# Patient Record
Sex: FEMALE | Race: WHITE | Employment: UNEMPLOYED | URBAN - METROPOLITAN AREA
[De-identification: names, ages, dates, MRNs, and addresses within clinical notes are randomized per-mention and may not be internally consistent; named-entity substitution may affect disease eponyms.]

---

## 2018-10-04 ENCOUNTER — OFFICE VISIT (OUTPATIENT)
Dept: NEUROLOGY | Facility: CLINIC | Age: 63
End: 2018-10-04
Payer: MEDICAID

## 2018-10-04 VITALS
RESPIRATION RATE: 16 BRPM | BODY MASS INDEX: 32 KG/M2 | WEIGHT: 183 LBS | HEART RATE: 70 BPM | SYSTOLIC BLOOD PRESSURE: 130 MMHG | DIASTOLIC BLOOD PRESSURE: 80 MMHG

## 2018-10-04 DIAGNOSIS — R42 VERTIGO: Primary | ICD-10-CM

## 2018-10-04 DIAGNOSIS — R55 NEAR SYNCOPE: ICD-10-CM

## 2018-10-04 DIAGNOSIS — E55.9 VITAMIN D DEFICIENCY: ICD-10-CM

## 2018-10-04 PROCEDURE — 99204 OFFICE O/P NEW MOD 45 MIN: CPT | Performed by: OTHER

## 2018-10-04 RX ORDER — CHOLECALCIFEROL (VITAMIN D3) 1250 MCG
CAPSULE ORAL WEEKLY
Refills: 0 | COMMUNITY
Start: 2018-09-17 | End: 2019-02-04

## 2018-10-04 RX ORDER — MECLIZINE HYDROCHLORIDE 25 MG/1
TABLET ORAL AS NEEDED
Refills: 0 | COMMUNITY
Start: 2018-08-30 | End: 2019-02-04

## 2018-10-04 NOTE — PATIENT INSTRUCTIONS
Refill policies:    • Allow 2-3 business days for refills; controlled substances may take longer.   • Contact your pharmacy at least 5 days prior to running out of medication and have them send an electronic request or submit request through the “request re entire amount billed. Precertification and Prior Authorizations: If your physician has recommended that you have a procedure or additional testing performed.   [facility]) will contact your insurance carrier to obtain pre-certi

## 2018-10-04 NOTE — PROGRESS NOTES
[facility]    Date of consult: 10/4/2018    CC: vertigo     HPI: Reta Erazo is a 66year old female with past medical history as listed below presents here for initial evaluation of one episode of vertigo 2 months ago, pt repor soft, non tender, no masses  Extremities: no edema  Carotid bruits: no  Neurological Examination:  Mental status: A & O X 3  Language: Fluency with normal naming and repetition, comprehension normal  Speech: no dysarthria  CN: II-XII    pupils 2-3 mm equal

## 2018-10-31 ENCOUNTER — TELEPHONE (OUTPATIENT)
Dept: NEUROLOGY | Facility: CLINIC | Age: 63
End: 2018-10-31

## 2018-10-31 NOTE — TELEPHONE ENCOUNTER
Approval #[id number] at [facility] from 10/31/18-12/15/18 for CPT code 99791 2D Doppler     Called patient gave her above information she was thankful for my call.

## 2018-11-12 ENCOUNTER — HOSPITAL ENCOUNTER (OUTPATIENT)
Dept: ULTRASOUND IMAGING | Age: 63
Discharge: HOME | End: 2018-11-12
Attending: Other
Payer: MEDICAID

## 2018-11-12 DIAGNOSIS — R42 VERTIGO: ICD-10-CM

## 2018-11-12 DIAGNOSIS — E55.9 VITAMIN D DEFICIENCY: ICD-10-CM

## 2018-11-12 DIAGNOSIS — R55 NEAR SYNCOPE: ICD-10-CM

## 2018-11-12 PROCEDURE — 93880 EXTRACRANIAL BILAT STUDY: CPT | Performed by: OTHER

## 2018-11-15 ENCOUNTER — TELEPHONE (OUTPATIENT)
Dept: NEUROLOGY | Facility: CLINIC | Age: 63
End: 2018-11-15

## 2019-02-04 ENCOUNTER — TELEPHONE (OUTPATIENT)
Dept: INTERNAL MEDICINE CLINIC | Facility: CLINIC | Age: 64
End: 2019-02-04

## 2019-02-04 ENCOUNTER — OFFICE VISIT (OUTPATIENT)
Dept: INTERNAL MEDICINE CLINIC | Facility: CLINIC | Age: 64
End: 2019-02-04
Payer: MEDICAID

## 2019-02-04 VITALS
TEMPERATURE: 98 F | SYSTOLIC BLOOD PRESSURE: 110 MMHG | WEIGHT: 188.5 LBS | OXYGEN SATURATION: 96 % | HEART RATE: 92 BPM | DIASTOLIC BLOOD PRESSURE: 85 MMHG | RESPIRATION RATE: 12 BRPM | BODY MASS INDEX: 33.4 KG/M2 | HEIGHT: 63 IN

## 2019-02-04 DIAGNOSIS — J34.2 DEVIATED SEPTUM: Primary | ICD-10-CM

## 2019-02-04 DIAGNOSIS — L50.2 URTICARIA DUE TO COLD: ICD-10-CM

## 2019-02-04 DIAGNOSIS — E55.9 VITAMIN D DEFICIENCY: ICD-10-CM

## 2019-02-04 DIAGNOSIS — I10 ESSENTIAL HYPERTENSION: ICD-10-CM

## 2019-02-04 DIAGNOSIS — H90.5 SENSORINEURAL HEARING LOSS (SNHL) OF LEFT EAR, UNSPECIFIED HEARING STATUS ON CONTRALATERAL SIDE: ICD-10-CM

## 2019-02-04 PROCEDURE — 99204 OFFICE O/P NEW MOD 45 MIN: CPT | Performed by: INTERNAL MEDICINE

## 2019-02-04 RX ORDER — LISINOPRIL 5 MG/1
5 TABLET ORAL DAILY
Refills: 0 | COMMUNITY
Start: 2019-01-10 | End: 2019-02-12

## 2019-02-04 RX ORDER — CHOLECALCIFEROL (VITAMIN D3) 50 MCG
4000 TABLET ORAL DAILY
COMMUNITY

## 2019-02-04 RX ORDER — TETANUS TOXOID, REDUCED DIPHTHERIA TOXOID AND ACELLULAR PERTUSSIS VACCINE, ADSORBED 5; 2.5; 8; 8; 2.5 [IU]/.5ML; [IU]/.5ML; UG/.5ML; UG/.5ML; UG/.5ML
SUSPENSION INTRAMUSCULAR
Refills: 0 | COMMUNITY
Start: 2018-12-01 | End: 2019-02-04

## 2019-02-04 RX ORDER — AMOXICILLIN AND CLAVULANATE POTASSIUM 875; 125 MG/1; MG/1
1 TABLET, FILM COATED ORAL 2 TIMES DAILY
Qty: 14 TABLET | Refills: 0 | Status: SHIPPED | OUTPATIENT
Start: 2019-02-04 | End: 2019-02-11

## 2019-02-04 NOTE — PROGRESS NOTES
Tommie Conley is a 66year old female. HPI:   Patient presents for the following issues. 1. Started to feel sick 2 weeks ago: states she has a deviated septum so she often gets URIs. She has sinus congestion. rhinorrea and sneezing in the morning.  Has Social History    Tobacco Use      Smoking status: Former Smoker        Packs/day: 0.00        Years: 15.00        Pack years: 0        Types: Cigarettes        Quit date:         Years since quittin.0      Smokeless tobacco: Never Used    Alcoh malignancy. Chlamydiaand gonorrhea were not detected  Lab    The patient indicates understanding of these issues and agrees to the plan. The patient is asked to return in 6 weeks for CPX.   Prince Davis MD

## 2019-02-04 NOTE — TELEPHONE ENCOUNTER
Records requested from;   [facility] fax# [phone]  Requesting: labs, mammogram. Colonoscopy, and pap.    Fax sent successfully to fax #[phone]

## 2019-02-04 NOTE — PATIENT INSTRUCTIONS
Please bring your machine into your next office visit to ensure it is accurate. I like the [employer] brand. Please keep the receipt. Please measure your blood pressure and pulse daily and record these values.  Please measure your blood pressure in the afterno

## 2019-02-12 RX ORDER — LISINOPRIL 5 MG/1
5 TABLET ORAL DAILY
Qty: 90 TABLET | Refills: 0 | Status: SHIPPED | OUTPATIENT
Start: 2019-02-12 | End: 2019-02-14

## 2019-02-12 NOTE — TELEPHONE ENCOUNTER
Refill requested:   Requested Prescriptions     Pending Prescriptions Disp Refills   • lisinopril 5 MG Oral Tab 90 tablet 0     Sig: Take 1 tablet (5 mg total) by mouth daily.        Failed protocol    Hypertension Medications Protocol Failed2/12 10:09 AM

## 2019-02-12 NOTE — TELEPHONE ENCOUNTER
lisinopril 5 MG   [id number] - [address] AT [facility] OF [address] [place] [address], [phone], [phone]

## 2019-02-15 RX ORDER — LISINOPRIL 5 MG/1
TABLET ORAL
Qty: 90 TABLET | Refills: 0 | Status: SHIPPED | OUTPATIENT
Start: 2019-02-15 | End: 2019-05-17

## 2019-02-15 NOTE — TELEPHONE ENCOUNTER
Refill requested:   Requested Prescriptions     Pending Prescriptions Disp Refills   • LISINOPRIL 5 MG Oral Tab [Pharmacy Med Name: LISINOPRIL 5MG TABLETS] 90 tablet 0     Sig: TAKE 1 TABLET BY MOUTH DAILY       Failed protocol  Hypertension Medications Pr

## 2019-04-29 ENCOUNTER — OFFICE VISIT (OUTPATIENT)
Dept: INTERNAL MEDICINE CLINIC | Facility: CLINIC | Age: 64
End: 2019-04-29
Payer: MEDICAID

## 2019-04-29 ENCOUNTER — APPOINTMENT (OUTPATIENT)
Dept: LAB | Age: 64
End: 2019-04-29
Attending: INTERNAL MEDICINE
Payer: MEDICAID

## 2019-04-29 ENCOUNTER — TELEPHONE (OUTPATIENT)
Dept: INTERNAL MEDICINE CLINIC | Facility: CLINIC | Age: 64
End: 2019-04-29

## 2019-04-29 VITALS
WEIGHT: 193.25 LBS | SYSTOLIC BLOOD PRESSURE: 126 MMHG | HEART RATE: 77 BPM | TEMPERATURE: 99 F | DIASTOLIC BLOOD PRESSURE: 76 MMHG | HEIGHT: 63 IN | RESPIRATION RATE: 16 BRPM | OXYGEN SATURATION: 99 % | BODY MASS INDEX: 34.24 KG/M2

## 2019-04-29 DIAGNOSIS — I10 ESSENTIAL HYPERTENSION: ICD-10-CM

## 2019-04-29 DIAGNOSIS — E55.9 VITAMIN D DEFICIENCY: ICD-10-CM

## 2019-04-29 DIAGNOSIS — Z12.4 SCREENING FOR CERVICAL CANCER: ICD-10-CM

## 2019-04-29 DIAGNOSIS — Z12.11 SCREEN FOR COLON CANCER: ICD-10-CM

## 2019-04-29 DIAGNOSIS — Z13.29 SCREENING FOR THYROID DISORDER: ICD-10-CM

## 2019-04-29 DIAGNOSIS — Z00.00 ROUTINE GENERAL MEDICAL EXAMINATION AT A HEALTH CARE FACILITY: ICD-10-CM

## 2019-04-29 DIAGNOSIS — Z00.00 ROUTINE GENERAL MEDICAL EXAMINATION AT A HEALTH CARE FACILITY: Primary | ICD-10-CM

## 2019-04-29 DIAGNOSIS — Z12.39 SCREENING FOR MALIGNANT NEOPLASM OF BREAST: ICD-10-CM

## 2019-04-29 PROCEDURE — 87624 HPV HI-RISK TYP POOLED RSLT: CPT | Performed by: INTERNAL MEDICINE

## 2019-04-29 PROCEDURE — 82306 VITAMIN D 25 HYDROXY: CPT

## 2019-04-29 PROCEDURE — 88175 CYTOPATH C/V AUTO FLUID REDO: CPT | Performed by: INTERNAL MEDICINE

## 2019-04-29 PROCEDURE — 99396 PREV VISIT EST AGE 40-64: CPT | Performed by: INTERNAL MEDICINE

## 2019-04-29 PROCEDURE — 86803 HEPATITIS C AB TEST: CPT

## 2019-04-29 PROCEDURE — 84450 TRANSFERASE (AST) (SGOT): CPT

## 2019-04-29 PROCEDURE — 36415 COLL VENOUS BLD VENIPUNCTURE: CPT

## 2019-04-29 PROCEDURE — 84460 ALANINE AMINO (ALT) (SGPT): CPT

## 2019-04-29 PROCEDURE — 80061 LIPID PANEL: CPT

## 2019-04-29 PROCEDURE — 80048 BASIC METABOLIC PNL TOTAL CA: CPT

## 2019-04-29 NOTE — TELEPHONE ENCOUNTER
Records Requested from:    Ernie Cortez (Colonoscopy)    Fax: [phone]. Confirmation was received. Copy of form made and placed in teal accordion file. Original form sent to scanning.

## 2019-04-29 NOTE — PROGRESS NOTES
Trinidad Tarango is a 60year old female. HPI:   Patient presents for a physical exam.   1. HTN: home pressures are > 130/80s. She feels pressure in her head 4 times per week like her BP is high and sometimes takes an extra dose of lisinopril.  She has been History:    Social History    Tobacco Use      Smoking status: Former Smoker        Packs/day: 0.00        Years: 15.00        Pack years: 0        Types: Cigarettes        Quit date:         Years since quittin.3      Smokeless tobacco: Never Used yo): no indication for ASA at this time. Colon Cancer Screening: request results from [address] [place] [address].   Cervical Cancer Screening: Pap smear by  [facility] 2/19/2018 - satisfactory for eval, EC zone present.  Neg for intraepithelial lesion or m

## 2019-04-29 NOTE — PATIENT INSTRUCTIONS
You need 3 eight ounce servings of calcium/vitamin D daily. This includes spinach, kale, broccoli, almonds, milk, yogurt, or cottage cheese. Please bring your machine into your next office visit to ensure it is accurate. I like the [facility] brand.  Please

## 2019-04-30 NOTE — TELEPHONE ENCOUNTER
Paper received from [facility] stating patient did NOT have a colonoscopy with them.     Left message with patient

## 2019-05-02 ENCOUNTER — HOSPITAL ENCOUNTER (OUTPATIENT)
Dept: MAMMOGRAPHY | Age: 64
Discharge: HOME | End: 2019-05-02
Attending: INTERNAL MEDICINE
Payer: MEDICAID

## 2019-05-02 ENCOUNTER — TELEPHONE (OUTPATIENT)
Dept: INTERNAL MEDICINE CLINIC | Facility: CLINIC | Age: 64
End: 2019-05-02

## 2019-05-02 DIAGNOSIS — Z12.11 SCREEN FOR COLON CANCER: ICD-10-CM

## 2019-05-02 DIAGNOSIS — Z12.39 SCREENING FOR MALIGNANT NEOPLASM OF BREAST: ICD-10-CM

## 2019-05-02 DIAGNOSIS — Z12.39 SCREENING FOR MALIGNANT NEOPLASM OF BREAST: Primary | ICD-10-CM

## 2019-05-02 PROCEDURE — 77067 SCR MAMMO BI INCL CAD: CPT | Performed by: INTERNAL MEDICINE

## 2019-05-02 PROCEDURE — 77063 BREAST TOMOSYNTHESIS BI: CPT | Performed by: INTERNAL MEDICINE

## 2019-05-02 NOTE — TELEPHONE ENCOUNTER
RX changed to appropriate screening code for screening mammogram.     Elaina Dunn notified that [address] code has been changed.

## 2019-05-02 NOTE — TELEPHONE ENCOUNTER
The Mammogram tech came into the office and stated that the patient has a mammogram scheduled for today at 9:40am and the diagnosis code is incorrect. Please advise.

## 2019-05-07 ENCOUNTER — TELEPHONE (OUTPATIENT)
Dept: INTERNAL MEDICINE CLINIC | Facility: CLINIC | Age: 64
End: 2019-05-07

## 2019-05-07 NOTE — TELEPHONE ENCOUNTER
Pt stated that she received a call from [address] [place] [address] her that they received a request for her to have a colonoscopy.      Informed pt that we sent them a request for medical records on 4/29/19 and received a response back stated that she did not h

## 2019-05-07 NOTE — TELEPHONE ENCOUNTER
Pt called stating that we (Dr Francine Christiansen nurse) did a referral for her to get a colonoscopy at [facility] and what needed to be done was get records. She states that she had a colonoscopy 1 year ago.  Would like to speak to the nurse

## 2019-05-17 ENCOUNTER — TELEPHONE (OUTPATIENT)
Dept: INTERNAL MEDICINE CLINIC | Facility: CLINIC | Age: 64
End: 2019-05-17

## 2019-05-17 RX ORDER — LISINOPRIL 5 MG/1
5 TABLET ORAL
Qty: 90 TABLET | Refills: 1 | Status: SHIPPED | OUTPATIENT
Start: 2019-05-17 | End: 2019-11-20

## 2019-05-17 NOTE — TELEPHONE ENCOUNTER
Per guidelines, we recommend bone scans at age 74 unless a person sustains a fracture from very mild injury. Also, please remind her we have no records of her colonoscopy - we requested them from [address] [place] [address] who stated patient never had a colonoscopy.  Thu

## 2019-05-17 NOTE — TELEPHONE ENCOUNTER
Refill requested:   Requested Prescriptions     Pending Prescriptions Disp Refills   • lisinopril 5 MG Oral Tab 90 tablet 0     Sig: Take 1 tablet (5 mg total) by mouth once daily.        Last refill: 2/15/2019 # 90 tabs with 0 refills       Blood Pressure:

## 2019-05-20 NOTE — TELEPHONE ENCOUNTER
Pt provided with information listed below. She denies any recent injury to require DEXA.      Also medical release forms were signed/sent to [employer] which stated that pt did not receive colonoscopy their (see te from 5/7/19)    Per verbal authorization

## 2019-05-21 NOTE — TELEPHONE ENCOUNTER
Colonoscopy report received. Health Maintenance Updated.     [facility]

## 2019-05-24 ENCOUNTER — HOSPITAL ENCOUNTER (OUTPATIENT)
Dept: ULTRASOUND IMAGING | Age: 64
Discharge: HOME | End: 2019-05-24
Attending: INTERNAL MEDICINE
Payer: MEDICAID

## 2019-05-24 ENCOUNTER — HOSPITAL ENCOUNTER (OUTPATIENT)
Dept: MAMMOGRAPHY | Age: 64
Discharge: HOME | End: 2019-05-24
Attending: INTERNAL MEDICINE
Payer: MEDICAID

## 2019-05-24 DIAGNOSIS — R92.8 ABNORMAL MAMMOGRAM: ICD-10-CM

## 2019-05-24 PROCEDURE — 76642 ULTRASOUND BREAST LIMITED: CPT | Performed by: INTERNAL MEDICINE

## 2019-05-24 PROCEDURE — 77061 BREAST TOMOSYNTHESIS UNI: CPT | Performed by: INTERNAL MEDICINE

## 2019-05-24 PROCEDURE — 77065 DX MAMMO INCL CAD UNI: CPT | Performed by: INTERNAL MEDICINE

## 2019-11-20 ENCOUNTER — APPOINTMENT (OUTPATIENT)
Dept: LAB | Age: 64
End: 2019-11-20
Attending: INTERNAL MEDICINE
Payer: MEDICAID

## 2019-11-20 ENCOUNTER — OFFICE VISIT (OUTPATIENT)
Dept: INTERNAL MEDICINE CLINIC | Facility: CLINIC | Age: 64
End: 2019-11-20
Payer: MEDICAID

## 2019-11-20 VITALS
SYSTOLIC BLOOD PRESSURE: 122 MMHG | HEIGHT: 63 IN | DIASTOLIC BLOOD PRESSURE: 68 MMHG | WEIGHT: 190 LBS | RESPIRATION RATE: 16 BRPM | OXYGEN SATURATION: 99 % | HEART RATE: 88 BPM | BODY MASS INDEX: 33.66 KG/M2

## 2019-11-20 DIAGNOSIS — E66.09 CLASS 1 OBESITY DUE TO EXCESS CALORIES WITH SERIOUS COMORBIDITY AND BODY MASS INDEX (BMI) OF 33.0 TO 33.9 IN ADULT: ICD-10-CM

## 2019-11-20 DIAGNOSIS — Z12.31 VISIT FOR SCREENING MAMMOGRAM: ICD-10-CM

## 2019-11-20 DIAGNOSIS — F32.A DEPRESSION, UNSPECIFIED DEPRESSION TYPE: ICD-10-CM

## 2019-11-20 DIAGNOSIS — I10 ESSENTIAL HYPERTENSION: Primary | ICD-10-CM

## 2019-11-20 DIAGNOSIS — I10 ESSENTIAL HYPERTENSION: ICD-10-CM

## 2019-11-20 DIAGNOSIS — R06.00 PAROXYSMAL NOCTURNAL DYSPNEA: ICD-10-CM

## 2019-11-20 DIAGNOSIS — R06.83 SNORING: ICD-10-CM

## 2019-11-20 PROCEDURE — 90686 IIV4 VACC NO PRSV 0.5 ML IM: CPT | Performed by: INTERNAL MEDICINE

## 2019-11-20 PROCEDURE — 99214 OFFICE O/P EST MOD 30 MIN: CPT | Performed by: INTERNAL MEDICINE

## 2019-11-20 PROCEDURE — 84450 TRANSFERASE (AST) (SGOT): CPT

## 2019-11-20 PROCEDURE — 84460 ALANINE AMINO (ALT) (SGPT): CPT

## 2019-11-20 PROCEDURE — 90471 IMMUNIZATION ADMIN: CPT | Performed by: INTERNAL MEDICINE

## 2019-11-20 PROCEDURE — 80048 BASIC METABOLIC PNL TOTAL CA: CPT

## 2019-11-20 PROCEDURE — 36415 COLL VENOUS BLD VENIPUNCTURE: CPT

## 2019-11-20 RX ORDER — LISINOPRIL 5 MG/1
5 TABLET ORAL
Qty: 90 TABLET | Refills: 1 | Status: SHIPPED | OUTPATIENT
Start: 2019-11-20 | End: 2020-06-03

## 2019-11-20 NOTE — PROGRESS NOTES
Adeline Colón is a 60year old female. HPI:   Patient presents for the following issues. 1. HTN: not checking home pressures. Doing well on lisinopril. 2. Vitamin D deficiency: she has increased her supplement to 4000 units daily  3.  Has bad sinus is (68 kg)        Levofloxacin            SWELLING    Comment:Joint pain and swelling    Family History   Problem Relation Age of Onset   • Heart Attack Brother    • Cancer Brother 54        colon cancer   • Other (Other) Sister         heart transplant   • B continue daily supplement  # Essential HTN: well controlled on lisinopril, refilled. Due for labs. 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 # Family History of Colon Cancer in brother: needs colonoscopy every 5 years. Due in 2022.   # Depression a

## 2019-11-29 ENCOUNTER — HOSPITAL ENCOUNTER (OUTPATIENT)
Age: 64
Discharge: HOME | End: 2019-11-29
Attending: FAMILY MEDICINE
Payer: MEDICAID

## 2019-11-29 VITALS
TEMPERATURE: 98 F | RESPIRATION RATE: 16 BRPM | SYSTOLIC BLOOD PRESSURE: 141 MMHG | OXYGEN SATURATION: 97 % | DIASTOLIC BLOOD PRESSURE: 79 MMHG | HEART RATE: 63 BPM

## 2019-11-29 DIAGNOSIS — T14.8XXA INFECTED OPEN WOUND: Primary | ICD-10-CM

## 2019-11-29 DIAGNOSIS — L08.9 INFECTED OPEN WOUND: Primary | ICD-10-CM

## 2019-11-29 DIAGNOSIS — T22.212A SECOND DEGREE BURN OF FOREARM, LEFT, INITIAL ENCOUNTER: ICD-10-CM

## 2019-11-29 PROCEDURE — 99203 OFFICE O/P NEW LOW 30 MIN: CPT

## 2019-11-29 PROCEDURE — 87070 CULTURE OTHR SPECIMN AEROBIC: CPT | Performed by: FAMILY MEDICINE

## 2019-11-29 PROCEDURE — 87205 SMEAR GRAM STAIN: CPT | Performed by: FAMILY MEDICINE

## 2019-11-29 PROCEDURE — 99214 OFFICE O/P EST MOD 30 MIN: CPT

## 2019-11-29 RX ORDER — CEPHALEXIN 500 MG/1
500 CAPSULE ORAL 3 TIMES DAILY
Qty: 21 CAPSULE | Refills: 0 | Status: SHIPPED | OUTPATIENT
Start: 2019-11-29 | End: 2019-12-06

## 2019-11-29 NOTE — ED INITIAL ASSESSMENT (HPI)
C/o burn to left forearm 4 days ago with hot clothes iron. Treating OTC with burn jelplus. Area blistered yesterday and broke and with itchy rashes around.  TDAP 2018

## 2019-11-29 NOTE — ED PROVIDER NOTES
Patient Seen in: [facility]      History   Patient presents with:  Burn (integumentary)    Stated Complaint: burn from iron left arm x 4 days    HPI    62-year-old female with a history of hypertension right-hand-dominant presen Exam    General: Pleasant female in no acute distress  Neuro: Sensation is intact. No focal deficits of the left upper extremity  Vascular: Peripheral pulses are intact. Musculoskeletal: No acute deformity.   Full range of motion of bilateral upper extrem

## 2019-12-02 ENCOUNTER — TELEPHONE (OUTPATIENT)
Dept: INTERNAL MEDICINE CLINIC | Facility: CLINIC | Age: 64
End: 2019-12-02

## 2019-12-02 NOTE — TELEPHONE ENCOUNTER
Patient calling because she needs a f/u for burn on arm. She was seen at [address] [place] [address] and was told it was infected and now she feels getting worse, the area around is larger. Please call to triage further, she set an appointment but not for 2 days.

## 2019-12-02 NOTE — TELEPHONE ENCOUNTER
Pt states that burn/wound is not better/not worse. She just wants to make sure abx are working. Is changing dressing 2-3 times a day after cleaning area and does notice some \"off white\" drainage.      Denies redness/swelling extending, fever, chills, N/V

## 2019-12-04 ENCOUNTER — OFFICE VISIT (OUTPATIENT)
Dept: INTERNAL MEDICINE CLINIC | Facility: CLINIC | Age: 64
End: 2019-12-04
Payer: MEDICAID

## 2019-12-04 VITALS
TEMPERATURE: 98 F | HEART RATE: 74 BPM | HEIGHT: 63 IN | DIASTOLIC BLOOD PRESSURE: 78 MMHG | BODY MASS INDEX: 33.89 KG/M2 | RESPIRATION RATE: 16 BRPM | OXYGEN SATURATION: 97 % | WEIGHT: 191.25 LBS | SYSTOLIC BLOOD PRESSURE: 124 MMHG

## 2019-12-04 DIAGNOSIS — T30.0 BURN: ICD-10-CM

## 2019-12-04 DIAGNOSIS — L23.5 ALLERGIC DERMATITIS DUE TO OTHER CHEMICAL PRODUCT: Primary | ICD-10-CM

## 2019-12-04 PROCEDURE — 99213 OFFICE O/P EST LOW 20 MIN: CPT | Performed by: NURSE PRACTITIONER

## 2019-12-04 RX ORDER — BETAMETHASONE DIPROPIONATE 0.5 MG/G
1 CREAM TOPICAL 2 TIMES DAILY
Qty: 1 TUBE | Refills: 0 | Status: SHIPPED | OUTPATIENT
Start: 2019-12-04 | End: 2019-12-18

## 2019-12-04 NOTE — PROGRESS NOTES
Patient presents with: Follow - Up: IC Burn on left forearm 1 week        History of Present Illness:  Charlie Mixon is a a(n) 60year old female who presents with a wound to the left forearm for 8 days. Inciting event: burned with iron.  Seen in urgent car 0  Hypergranulation: No  Tunneling:  No  Undermining:  No  Sinus Tract:  No     Wound Description:    Wound Encounter: Initial  Thickness: Partial Thickness  Exudate Amount: None  Exudate Type: N/A  Exudate Color: N/A  Odor: None  Pain: 2  Wound Margin: Ap

## 2019-12-04 NOTE — PATIENT INSTRUCTIONS
Contact Dermatitis  Contact dermatitis is a skin rash caused by something that touches the skin and makes it irritated and inflamed. Your skin may be red, swollen, dry, and may be cracked. Blisters may form and ooze. The rash will itch.   Contact dermatit Call your healthcare provider right away if any of these occur:  · Spreading of the rash to other parts of your body  · Severe swelling of your face, eyelids, mouth, throat or tongue  · Trouble urinating due to swelling in the genital area  · Fever of 100.

## 2019-12-05 ENCOUNTER — TELEPHONE (OUTPATIENT)
Dept: INTERNAL MEDICINE CLINIC | Facility: CLINIC | Age: 64
End: 2019-12-05

## 2019-12-05 NOTE — TELEPHONE ENCOUNTER
Patient was here yesterday and saw Nannette Burns and the medication Betamethasone that was given to her is giving her side effect.  Giving her tightness in chest and hard time breathing

## 2020-01-10 ENCOUNTER — OFFICE VISIT (OUTPATIENT)
Dept: INTERNAL MEDICINE CLINIC | Facility: CLINIC | Age: 65
End: 2020-01-10
Payer: MEDICAID

## 2020-01-10 VITALS
OXYGEN SATURATION: 98 % | HEART RATE: 78 BPM | HEIGHT: 63 IN | BODY MASS INDEX: 34.02 KG/M2 | RESPIRATION RATE: 16 BRPM | DIASTOLIC BLOOD PRESSURE: 66 MMHG | WEIGHT: 192 LBS | TEMPERATURE: 98 F | SYSTOLIC BLOOD PRESSURE: 122 MMHG

## 2020-01-10 DIAGNOSIS — I10 ESSENTIAL HYPERTENSION: ICD-10-CM

## 2020-01-10 DIAGNOSIS — N39.3 OVERFLOW STRESS URINARY INCONTINENCE IN FEMALE: Primary | ICD-10-CM

## 2020-01-10 DIAGNOSIS — M54.42 ACUTE LEFT-SIDED LOW BACK PAIN WITH LEFT-SIDED SCIATICA: ICD-10-CM

## 2020-01-10 DIAGNOSIS — N39.490 OVERFLOW STRESS URINARY INCONTINENCE IN FEMALE: Primary | ICD-10-CM

## 2020-01-10 DIAGNOSIS — N75.0 BARTHOLIN GLAND CYST: ICD-10-CM

## 2020-01-10 PROCEDURE — 99214 OFFICE O/P EST MOD 30 MIN: CPT | Performed by: INTERNAL MEDICINE

## 2020-01-10 NOTE — PROGRESS NOTES
Shaq Khan is a 60year old female. HPI:   Patient presents for the following issues. 1. HTN: does not check pressures at home. Doing well on lisinopril. 2. Obesity: she was able to lose weight prior to holidays.  She had cut down sweets and increa swelling    Family History   Problem Relation Age of Onset   • Heart Attack Brother    • Cancer Brother 54        colon cancer   • Other (Other) Sister         heart transplant   • Breast Cancer Paternal Aunt 44      Past Medical History:   Diagnosis Date referral. She will start with HEP and PT  # Left low back pain with L sided Sciatica: will start with HEP and PT. Safe use of nsaids and tylenol discussed.    # Deviated Septum and Left hearing impairment: she does not have time to see ENT  # Allergic Rhini asked to return in 6 months for CPX.    Ulis Sever, MD

## 2020-05-27 ENCOUNTER — HOSPITAL ENCOUNTER (OUTPATIENT)
Dept: MAMMOGRAPHY | Age: 65
Discharge: HOME | End: 2020-05-27
Attending: INTERNAL MEDICINE
Payer: MEDICAID

## 2020-05-27 DIAGNOSIS — Z12.31 VISIT FOR SCREENING MAMMOGRAM: ICD-10-CM

## 2020-05-27 PROCEDURE — 77063 BREAST TOMOSYNTHESIS BI: CPT | Performed by: INTERNAL MEDICINE

## 2020-05-27 PROCEDURE — 77067 SCR MAMMO BI INCL CAD: CPT | Performed by: INTERNAL MEDICINE

## 2020-05-27 RX ORDER — LISINOPRIL 5 MG/1
TABLET ORAL
Qty: 90 TABLET | Refills: 1 | OUTPATIENT
Start: 2020-05-27

## 2020-05-29 ENCOUNTER — APPOINTMENT (OUTPATIENT)
Dept: LAB | Age: 65
End: 2020-05-29
Attending: INTERNAL MEDICINE
Payer: MEDICAID

## 2020-05-29 DIAGNOSIS — N39.3 OVERFLOW STRESS URINARY INCONTINENCE IN FEMALE: ICD-10-CM

## 2020-05-29 DIAGNOSIS — I10 ESSENTIAL HYPERTENSION: ICD-10-CM

## 2020-05-29 DIAGNOSIS — N39.490 OVERFLOW STRESS URINARY INCONTINENCE IN FEMALE: ICD-10-CM

## 2020-05-29 PROCEDURE — 36415 COLL VENOUS BLD VENIPUNCTURE: CPT

## 2020-05-29 PROCEDURE — 80061 LIPID PANEL: CPT

## 2020-05-29 PROCEDURE — 84450 TRANSFERASE (AST) (SGOT): CPT

## 2020-05-29 PROCEDURE — 80048 BASIC METABOLIC PNL TOTAL CA: CPT

## 2020-05-29 PROCEDURE — 84460 ALANINE AMINO (ALT) (SGPT): CPT

## 2020-06-03 RX ORDER — LISINOPRIL 5 MG/1
TABLET ORAL
Qty: 90 TABLET | Refills: 0 | Status: SHIPPED | OUTPATIENT
Start: 2020-06-03 | End: 2020-09-01

## 2020-06-05 ENCOUNTER — APPOINTMENT (OUTPATIENT)
Dept: LAB | Age: 65
End: 2020-06-05
Attending: INTERNAL MEDICINE
Payer: MEDICAID

## 2020-06-05 DIAGNOSIS — R73.01 IMPAIRED FASTING BLOOD SUGAR: ICD-10-CM

## 2020-06-05 PROCEDURE — 36415 COLL VENOUS BLD VENIPUNCTURE: CPT

## 2020-06-05 PROCEDURE — 83036 HEMOGLOBIN GLYCOSYLATED A1C: CPT

## 2020-06-17 ENCOUNTER — OFFICE VISIT (OUTPATIENT)
Dept: INTERNAL MEDICINE CLINIC | Facility: CLINIC | Age: 65
End: 2020-06-17
Payer: MEDICAID

## 2020-06-17 VITALS
TEMPERATURE: 98 F | DIASTOLIC BLOOD PRESSURE: 60 MMHG | BODY MASS INDEX: 34.37 KG/M2 | WEIGHT: 194 LBS | HEIGHT: 63 IN | HEART RATE: 77 BPM | OXYGEN SATURATION: 98 % | RESPIRATION RATE: 14 BRPM | SYSTOLIC BLOOD PRESSURE: 110 MMHG

## 2020-06-17 DIAGNOSIS — Z00.00 ROUTINE GENERAL MEDICAL EXAMINATION AT A HEALTH CARE FACILITY: Primary | ICD-10-CM

## 2020-06-17 DIAGNOSIS — I10 ESSENTIAL HYPERTENSION: ICD-10-CM

## 2020-06-17 DIAGNOSIS — R06.83 SNORING: ICD-10-CM

## 2020-06-17 DIAGNOSIS — R06.00 PAROXYSMAL NOCTURNAL DYSPNEA: ICD-10-CM

## 2020-06-17 PROCEDURE — 99396 PREV VISIT EST AGE 40-64: CPT | Performed by: INTERNAL MEDICINE

## 2020-06-17 RX ORDER — RIBOFLAVIN (VITAMIN B2) 100 MG
100 TABLET ORAL DAILY
COMMUNITY

## 2020-06-17 NOTE — PROGRESS NOTES
Juve Cervantes is a 63year old female. HPI:   Patient presents for a physical exam.   1. HTN: doing well on lisinopril. 2. Obese and impaired fasting glucose: no DM. She is worried about her weight. She walks 30 minutes most days of the week.    24 cachorro of cold urticaria    • Vertigo    • Vitamin D deficiency       Past Surgical History:   Procedure Laterality Date   • OTHER      uterine cyst removed   • TONSILLECTOMY        Social History:    Social History    Tobacco Use      Smoking status: Former [employer] Family History of Colon Cancer in brother: needs colonoscopy every 5 years. Due in 2022. # Depression and anxiety: currently doing well. We have discussed healthy coping skills, emotional eating, counseling, and medications.  She has been on 3 antidepressa

## 2020-06-17 NOTE — PATIENT INSTRUCTIONS
You need 3 eight ounce servings of calcium/vitamin D daily. This includes spinach, kale, broccoli, almonds, milk, yogurt, or cottage cheese. Please repeat your labs in November, 2020. I advise you get the annual flu shot every September, October.

## 2020-06-22 ENCOUNTER — TELEPHONE (OUTPATIENT)
Dept: INTERNAL MEDICINE CLINIC | Facility: CLINIC | Age: 65
End: 2020-06-22

## 2020-06-22 NOTE — TELEPHONE ENCOUNTER
Patient was seen last week by Dr. Norma Verma for her physical. She stated that she forgot to tell her that she has a bump on her leg that is painful and burning at the same time. She stated that it's getting worse. She would like to speak with a nurse.  Please ad

## 2020-06-22 NOTE — TELEPHONE ENCOUNTER
Pt states that she has bump on L shin that is swollen and produces \"flashes of pain/burning sensation\" x 1 month. Denies injury, drainage, redness, warmth to the touch, bruising, chest pain, SOB, recent travel.      Future Appointments   Date Time Pro

## 2020-06-24 ENCOUNTER — OFFICE VISIT (OUTPATIENT)
Dept: INTERNAL MEDICINE CLINIC | Facility: CLINIC | Age: 65
End: 2020-06-24
Payer: MEDICAID

## 2020-06-24 VITALS
HEART RATE: 68 BPM | BODY MASS INDEX: 34.55 KG/M2 | OXYGEN SATURATION: 98 % | SYSTOLIC BLOOD PRESSURE: 124 MMHG | RESPIRATION RATE: 16 BRPM | TEMPERATURE: 98 F | HEIGHT: 63 IN | WEIGHT: 195 LBS | DIASTOLIC BLOOD PRESSURE: 68 MMHG

## 2020-06-24 DIAGNOSIS — R22.43 SKIN LUMP OF LEG, BILATERAL: Primary | ICD-10-CM

## 2020-06-24 NOTE — PROGRESS NOTES
Angie Gibson is a 63year old female. HPI:   Patient presents for the following issue. 1. Left shin lump: present for 1 month. Does not recall any injury. It is not growing. Intermittent burning, painful sensation in that area.  Also noticed a new lump Drug use: No            EXAM:   /68   Pulse 68   Temp 97.6 °F (36.4 °C) (Oral)   Resp 16   Ht 63\"   Wt 195 lb (88.5 kg)   SpO2 98%   BMI 34.54 kg/m²   GENERAL: A&O well developed, well nourished,in no apparent distress  SKIN: no rashes,no suspicious healthy coping skills, emotional eating, counseling, and medications. She has been on 3 antidepressants in past (could not remember name) and did not tolerate any of them.    # Morbid Obesity, BMI 34: Counseled again on healthy plant based nutrition, regula

## 2020-07-21 ENCOUNTER — OFFICE VISIT (OUTPATIENT)
Dept: SLEEP CENTER | Age: 65
End: 2020-07-21
Attending: INTERNAL MEDICINE
Payer: MEDICAID

## 2020-07-21 DIAGNOSIS — R06.83 SNORING: ICD-10-CM

## 2020-07-21 DIAGNOSIS — R06.00 PAROXYSMAL NOCTURNAL DYSPNEA: ICD-10-CM

## 2020-07-21 PROCEDURE — 95806 SLEEP STUDY UNATT&RESP EFFT: CPT

## 2020-07-24 NOTE — PROCEDURES
[address]       Accredited by the [facility] of [facility] ([facility])    PATIENT'S NAME:        Romeo Slaughter  ATTENDING PHYSICIAN:   Shantelle Montgomery M.D.   REFERRING PHYSICIAN:   Rena Samaniego MD  PATIENT A sedative medications at bedtime and engage in a formal weight loss program.  This patient should avoid driving while sleepy at all times.     Dictated By Angelia Britt M.D.  d: 07/23/2020 17:04:51  t: 07/23/2020 19:11:05  [facility] [id number]  TF/

## 2020-08-10 ENCOUNTER — ORDER TRANSCRIPTION (OUTPATIENT)
Dept: SLEEP CENTER | Age: 65
End: 2020-08-10

## 2020-08-10 DIAGNOSIS — Z01.818 OTHER SPECIFIED PRE-OPERATIVE EXAMINATION: Primary | ICD-10-CM

## 2020-08-10 DIAGNOSIS — Z11.59 SCREENING FOR VIRAL DISEASE: ICD-10-CM

## 2020-08-24 ENCOUNTER — APPOINTMENT (OUTPATIENT)
Dept: LAB | Facility: HOSPITAL | Age: 65
End: 2020-08-24
Attending: INTERNAL MEDICINE
Payer: MEDICAID

## 2020-08-24 DIAGNOSIS — Z01.818 OTHER SPECIFIED PRE-OPERATIVE EXAMINATION: ICD-10-CM

## 2020-08-24 DIAGNOSIS — Z11.59 SCREENING FOR VIRAL DISEASE: ICD-10-CM

## 2020-08-25 LAB — SARS-COV-2 RNA RESP QL NAA+PROBE: NOT DETECTED

## 2020-08-27 ENCOUNTER — OFFICE VISIT (OUTPATIENT)
Dept: SLEEP CENTER | Age: 65
End: 2020-08-27
Attending: INTERNAL MEDICINE
Payer: MEDICAID

## 2020-08-27 PROCEDURE — 95811 POLYSOM 6/>YRS CPAP 4/> PARM: CPT

## 2020-08-28 ENCOUNTER — OFFICE VISIT (OUTPATIENT)
Dept: OBGYN CLINIC | Facility: CLINIC | Age: 65
End: 2020-08-28
Payer: MEDICAID

## 2020-08-28 VITALS
SYSTOLIC BLOOD PRESSURE: 122 MMHG | HEIGHT: 62.75 IN | BODY MASS INDEX: 35.74 KG/M2 | WEIGHT: 199.19 LBS | HEART RATE: 72 BPM | TEMPERATURE: 98 F | DIASTOLIC BLOOD PRESSURE: 82 MMHG

## 2020-08-28 DIAGNOSIS — N90.7 VULVAR CYST: Primary | ICD-10-CM

## 2020-08-28 PROCEDURE — 3079F DIAST BP 80-89 MM HG: CPT | Performed by: OBSTETRICS & GYNECOLOGY

## 2020-08-28 PROCEDURE — 3074F SYST BP LT 130 MM HG: CPT | Performed by: OBSTETRICS & GYNECOLOGY

## 2020-08-28 PROCEDURE — 99203 OFFICE O/P NEW LOW 30 MIN: CPT | Performed by: OBSTETRICS & GYNECOLOGY

## 2020-08-28 PROCEDURE — 3008F BODY MASS INDEX DOCD: CPT | Performed by: OBSTETRICS & GYNECOLOGY

## 2020-08-28 RX ORDER — OMEPRAZOLE 20 MG/1
TABLET, DELAYED RELEASE ORAL
COMMUNITY
Start: 2020-08-12 | End: 2021-10-04

## 2020-08-31 PROBLEM — N90.7 VULVAR CYST: Status: ACTIVE | Noted: 2020-08-31

## 2020-08-31 NOTE — PROGRESS NOTES
GYN H&P     2020  7:56 AM    CC: Patient is here for vaginal cyst    HPI: patient is a 63year old  here for her vaginal cyst  She reports since approximately December she has noticed a right sided vulvar cyst. She reports it does sometimes get LISINOPRIL 5 MG Oral Tab, TAKE 1 TABLET(5 MG) BY MOUTH EVERY DAY, Disp: 90 tablet, Rfl: 0  Cholecalciferol (VITAMIN D) 2000 units Oral Tab, Take 4,000 Units by mouth daily.   , Disp: , Rfl:   Cetirizine HCl (ZYRTEC ALLERGY OR), Take 10 mg by mouth as needed A/P: Patient is 63year old female here for   1.  Vulvar cyst        Patient Active Problem List:     Vitamin D deficiency     Essential hypertension     Urticaria due to cold     Sensorineural hearing loss (SNHL) of left ear     Deviated septum     Snoring

## 2020-09-01 RX ORDER — LISINOPRIL 5 MG/1
5 TABLET ORAL DAILY
Qty: 90 TABLET | Refills: 0 | Status: SHIPPED | OUTPATIENT
Start: 2020-09-01 | End: 2020-12-07

## 2020-09-01 NOTE — PROCEDURES
[address] [place] [address]       Accredited by the [facility] of Sleep Medicine ([facility]ASM)    PATIENT'S NAME:        Ru Zaldivar  ATTENDING PHYSICIAN:   Tammy Hernandes M.D. REFERRING PHYSICIAN:   KENYA Mei The apnea-hypopnea index was 3.2 and oxyhemoglobin saturation kody was 89%. ECG:  ECG demonstrated a sinus rhythm throughout. PERIODIC LIMB MOVEMENTS:  Periodic limb movements were not observed.     EEG:  With the limited montage recorded, no EEG abn

## 2020-09-01 NOTE — TELEPHONE ENCOUNTER
Miquel requesting refill for:  LISINOPRIL 5 MG Oral Tab    [facility] #[id number] - [address] AT [address], [phone], [phone]

## 2020-10-12 ENCOUNTER — OFFICE VISIT (OUTPATIENT)
Dept: INTERNAL MEDICINE CLINIC | Facility: CLINIC | Age: 65
End: 2020-10-12
Payer: MEDICAID

## 2020-10-12 VITALS
RESPIRATION RATE: 12 BRPM | WEIGHT: 200 LBS | SYSTOLIC BLOOD PRESSURE: 124 MMHG | TEMPERATURE: 98 F | HEIGHT: 62.75 IN | DIASTOLIC BLOOD PRESSURE: 70 MMHG | BODY MASS INDEX: 35.88 KG/M2 | HEART RATE: 68 BPM | OXYGEN SATURATION: 99 %

## 2020-10-12 DIAGNOSIS — R21 SKIN RASH: Primary | ICD-10-CM

## 2020-10-12 PROCEDURE — 3008F BODY MASS INDEX DOCD: CPT | Performed by: INTERNAL MEDICINE

## 2020-10-12 PROCEDURE — 99213 OFFICE O/P EST LOW 20 MIN: CPT | Performed by: INTERNAL MEDICINE

## 2020-10-12 PROCEDURE — 3074F SYST BP LT 130 MM HG: CPT | Performed by: INTERNAL MEDICINE

## 2020-10-12 PROCEDURE — 3078F DIAST BP <80 MM HG: CPT | Performed by: INTERNAL MEDICINE

## 2020-10-12 NOTE — PROGRESS NOTES
Patient Office Visit    ASSESSMENT AND PLAN:   (R21) Skin rash  (primary encounter diagnosis)  Plan: DERM - INTERNAL, CBC WITH DIFFERENTIAL WITH         PLATELET  Note: unclear etiology of symptoms. No blisters noted that would suggest shingles.  Less likel gotten worse   - asymptomatic   - felt like two blisters popped, but did not see any blisters prior to the rash   - no discharge   - no peeling   - no fevers or chills    - of note, patient taking amoxicillin for a dental infection (amoxicillin)   - no anitra on file prior to visit.          REVIEW OF SYSTEMS   Constitutional: no fatigue normal energy no weight changes   HENT: normal sinuses and no mouth issues   Eyes: . normal vision no eye pain   Respiratory: normal respirations no cough   Cardiovascular: no C

## 2020-11-18 ENCOUNTER — IMMUNIZATION (OUTPATIENT)
Dept: INTERNAL MEDICINE CLINIC | Facility: CLINIC | Age: 65
End: 2020-11-18
Payer: MEDICAID

## 2020-11-18 ENCOUNTER — LAB ENCOUNTER (OUTPATIENT)
Dept: LAB | Age: 65
End: 2020-11-18
Attending: INTERNAL MEDICINE
Payer: MEDICAID

## 2020-11-18 DIAGNOSIS — R06.00 PAROXYSMAL NOCTURNAL DYSPNEA: ICD-10-CM

## 2020-11-18 DIAGNOSIS — I10 ESSENTIAL HYPERTENSION: ICD-10-CM

## 2020-11-18 DIAGNOSIS — R06.83 SNORING: ICD-10-CM

## 2020-11-18 DIAGNOSIS — Z23 NEED FOR VACCINATION: ICD-10-CM

## 2020-11-18 DIAGNOSIS — Z00.00 ROUTINE GENERAL MEDICAL EXAMINATION AT A HEALTH CARE FACILITY: ICD-10-CM

## 2020-11-18 PROCEDURE — 84450 TRANSFERASE (AST) (SGOT): CPT

## 2020-11-18 PROCEDURE — 90686 IIV4 VACC NO PRSV 0.5 ML IM: CPT | Performed by: INTERNAL MEDICINE

## 2020-11-18 PROCEDURE — 84460 ALANINE AMINO (ALT) (SGPT): CPT

## 2020-11-18 PROCEDURE — 83036 HEMOGLOBIN GLYCOSYLATED A1C: CPT

## 2020-11-18 PROCEDURE — 90471 IMMUNIZATION ADMIN: CPT | Performed by: INTERNAL MEDICINE

## 2020-11-18 PROCEDURE — 80048 BASIC METABOLIC PNL TOTAL CA: CPT

## 2020-11-18 PROCEDURE — 36415 COLL VENOUS BLD VENIPUNCTURE: CPT

## 2020-12-04 ENCOUNTER — TELEPHONE (OUTPATIENT)
Dept: INTERNAL MEDICINE CLINIC | Facility: CLINIC | Age: 65
End: 2020-12-04

## 2020-12-04 RX ORDER — LISINOPRIL 5 MG/1
5 TABLET ORAL DAILY
Qty: 30 TABLET | Refills: 2 | Status: CANCELLED | OUTPATIENT
Start: 2020-12-04

## 2020-12-04 NOTE — TELEPHONE ENCOUNTER
lisinopril 5 MG Oral Tab         Sig: Take 1 tablet (5 mg total) by mouth daily.     Disp:  30 tablet    Refills:  2    Start: 12/4/2020    Class: Normal    Last ordered: 3 months ago by Ash Fam MD     Hypertension Medications Protocol [id number]/[id number]/202

## 2020-12-05 NOTE — TELEPHONE ENCOUNTER
Passed protocol    Requesting lisinopril 5 MG Oral Tab  LOV: 6/17/20  RTC: 6 months  Last Relevant Labs: 11/18/20  Filled: 09/01/20 #90 with 0 refills    Future Appointments   Date Time Provider Abiel Acuña   12/16/2020 11:30 AM Kian Douglass MD [facility]

## 2020-12-07 RX ORDER — LISINOPRIL 5 MG/1
5 TABLET ORAL DAILY
Qty: 90 TABLET | Refills: 0 | Status: SHIPPED | OUTPATIENT
Start: 2020-12-07 | End: 2020-12-16

## 2020-12-16 ENCOUNTER — OFFICE VISIT (OUTPATIENT)
Dept: INTERNAL MEDICINE CLINIC | Facility: CLINIC | Age: 65
End: 2020-12-16
Payer: MEDICAID

## 2020-12-16 VITALS
TEMPERATURE: 98 F | BODY MASS INDEX: 35.35 KG/M2 | OXYGEN SATURATION: 98 % | RESPIRATION RATE: 12 BRPM | SYSTOLIC BLOOD PRESSURE: 128 MMHG | HEIGHT: 63.5 IN | WEIGHT: 202 LBS | DIASTOLIC BLOOD PRESSURE: 80 MMHG | HEART RATE: 76 BPM

## 2020-12-16 DIAGNOSIS — I10 ESSENTIAL HYPERTENSION: ICD-10-CM

## 2020-12-16 DIAGNOSIS — E66.01 CLASS 2 SEVERE OBESITY DUE TO EXCESS CALORIES WITH SERIOUS COMORBIDITY AND BODY MASS INDEX (BMI) OF 35.0 TO 35.9 IN ADULT (HCC): ICD-10-CM

## 2020-12-16 DIAGNOSIS — G47.33 OSA ON CPAP: Primary | ICD-10-CM

## 2020-12-16 DIAGNOSIS — F33.1 MODERATE EPISODE OF RECURRENT MAJOR DEPRESSIVE DISORDER (HCC): ICD-10-CM

## 2020-12-16 DIAGNOSIS — Z99.89 OSA ON CPAP: Primary | ICD-10-CM

## 2020-12-16 PROCEDURE — 3074F SYST BP LT 130 MM HG: CPT | Performed by: INTERNAL MEDICINE

## 2020-12-16 PROCEDURE — 99214 OFFICE O/P EST MOD 30 MIN: CPT | Performed by: INTERNAL MEDICINE

## 2020-12-16 PROCEDURE — 3079F DIAST BP 80-89 MM HG: CPT | Performed by: INTERNAL MEDICINE

## 2020-12-16 PROCEDURE — 3008F BODY MASS INDEX DOCD: CPT | Performed by: INTERNAL MEDICINE

## 2020-12-16 RX ORDER — BUPROPION HYDROCHLORIDE 300 MG/1
300 TABLET ORAL SEE ADMIN INSTRUCTIONS
Qty: 30 TABLET | Refills: 1 | Status: SHIPPED | OUTPATIENT
Start: 2020-12-16 | End: 2021-04-21

## 2020-12-16 RX ORDER — BUPROPION HYDROCHLORIDE 150 MG/1
150 TABLET ORAL SEE ADMIN INSTRUCTIONS
Qty: 7 TABLET | Refills: 0 | Status: SHIPPED | OUTPATIENT
Start: 2020-12-16 | End: 2021-04-21

## 2020-12-16 RX ORDER — LISINOPRIL 5 MG/1
5 TABLET ORAL DAILY
Qty: 90 TABLET | Refills: 3 | Status: SHIPPED | OUTPATIENT
Start: 2020-12-16

## 2020-12-16 NOTE — PROGRESS NOTES
Charlie Mixon is a 63year old female. HPI:   Patient presents for the following issue. 1. HTN: tolerating lisinopril. Has not been checking pressures at home.  However has had 3 MD visits since our last visit and pressures were at goal.  2. DAVE: tolera Past Medical History:   Diagnosis Date   • Essential hypertension    • History of cold urticaria    • DAVE (obstructive sleep apnea) 7/21/2020 [facility]    AHI 18 Supine AHI 22 non-supine AHI 9 Sao2 Van 66%   • Vertigo    • Vitamin D deficiency       Past time to see ENT  # Allergic Rhinitis: discussed use of daily zyrtec, bedside warm humidifier, and saline rinses. She is anxious to use anything daily.    # DAVE on CPAP: doing very well on CPAP  # Vitamin D Deficiency: continue daily supplement  # Essential

## 2020-12-16 NOTE — PATIENT INSTRUCTIONS
[address] Specialist is Leyla Tate. Phone: [phone]. Please reach out to her if you do not hear from her in the next 3 days. For your mood, please start wellbutrin (buproprion) 150 mg daily for 7 days then increase 300 mg daily.

## 2021-04-02 ENCOUNTER — IMMUNIZATION (OUTPATIENT)
Dept: LAB | Age: 66
End: 2021-04-02
Attending: HOSPITALIST
Payer: MEDICAID

## 2021-04-02 DIAGNOSIS — Z23 NEED FOR VACCINATION: Primary | ICD-10-CM

## 2021-04-02 PROCEDURE — 0001A SARSCOV2 VAC 30MCG/0.3ML IM: CPT

## 2021-04-19 ENCOUNTER — TELEPHONE (OUTPATIENT)
Dept: INTERNAL MEDICINE CLINIC | Facility: CLINIC | Age: 66
End: 2021-04-19

## 2021-04-19 ENCOUNTER — HOSPITAL ENCOUNTER (OUTPATIENT)
Age: 66
Discharge: HOME | End: 2021-04-19
Payer: MEDICAID

## 2021-04-19 VITALS
SYSTOLIC BLOOD PRESSURE: 116 MMHG | BODY MASS INDEX: 34.82 KG/M2 | HEART RATE: 78 BPM | HEIGHT: 63.5 IN | RESPIRATION RATE: 18 BRPM | TEMPERATURE: 98 F | WEIGHT: 199 LBS | DIASTOLIC BLOOD PRESSURE: 88 MMHG | OXYGEN SATURATION: 99 %

## 2021-04-19 DIAGNOSIS — T23.271A BURN, WRIST, SECOND DEGREE, RIGHT, INITIAL ENCOUNTER: Primary | ICD-10-CM

## 2021-04-19 PROCEDURE — 99213 OFFICE O/P EST LOW 20 MIN: CPT

## 2021-04-19 PROCEDURE — 16020 DRESS/DEBRID P-THICK BURN S: CPT

## 2021-04-19 RX ORDER — CEPHALEXIN 500 MG/1
500 CAPSULE ORAL 4 TIMES DAILY
Qty: 28 CAPSULE | Refills: 0 | Status: SHIPPED | OUTPATIENT
Start: 2021-04-19 | End: 2021-04-26

## 2021-04-19 NOTE — TELEPHONE ENCOUNTER
Pt called stating that she got a first degree burn on her wrist and it is blistering. She stated that she's been putting neosporin on it. She said it has not popped yet and there is still liquid inside.  She is requesting a call back from a nurse with recom

## 2021-04-19 NOTE — TELEPHONE ENCOUNTER
Pt burned R wrist with hot oil on Saturday. Original blister was small, but blister has grown in size, is oozing clear liquid and painful. Looking for appointment today d/t schedule.      Advised pt we do not have any available appointments today but elaine

## 2021-04-19 NOTE — ED PROVIDER NOTES
Patient Seen in: [facility]      History   Patient presents with:  Burn    Stated Complaint: burn to right wirst x 4 days    HPI/Subjective: This is a 45-year-old female with below stated medical history.   Presents to immediate care for noted in HPI. Constitutional and vital signs reviewed. All other systems reviewed and negative except as noted above.     Physical Exam     ED Triage Vitals [04/19/21 1744]   /88   Pulse 78   Resp 18   Temp 98 °F (36.7 °C)   Temp src Temporal Patient was given tube of Silvadene cream to apply twice a day for 7 days. Advised to keep burn clean and dry. Do not pop blisters. Keflex given to cover for any underlying infectious process due to surrounding erythema.   Advised to have wound check in

## 2021-04-19 NOTE — ED INITIAL ASSESSMENT (HPI)
Patient reports she burned her right wrist with an oil splatter on Thursday. Patient reports the burn started to look worse on Saturday.

## 2021-04-21 ENCOUNTER — OFFICE VISIT (OUTPATIENT)
Dept: INTERNAL MEDICINE CLINIC | Facility: CLINIC | Age: 66
End: 2021-04-21
Payer: MEDICAID

## 2021-04-21 VITALS
WEIGHT: 211.38 LBS | DIASTOLIC BLOOD PRESSURE: 80 MMHG | HEART RATE: 89 BPM | TEMPERATURE: 98 F | BODY MASS INDEX: 36.99 KG/M2 | HEIGHT: 63.5 IN | OXYGEN SATURATION: 98 % | RESPIRATION RATE: 18 BRPM | SYSTOLIC BLOOD PRESSURE: 126 MMHG

## 2021-04-21 DIAGNOSIS — Z09 HOSPITAL DISCHARGE FOLLOW-UP: Primary | ICD-10-CM

## 2021-04-21 DIAGNOSIS — T30.0 BURN: ICD-10-CM

## 2021-04-21 DIAGNOSIS — I10 ESSENTIAL HYPERTENSION: ICD-10-CM

## 2021-04-21 DIAGNOSIS — F33.1 MODERATE EPISODE OF RECURRENT MAJOR DEPRESSIVE DISORDER (HCC): ICD-10-CM

## 2021-04-21 PROCEDURE — 99214 OFFICE O/P EST MOD 30 MIN: CPT | Performed by: INTERNAL MEDICINE

## 2021-04-21 PROCEDURE — 3008F BODY MASS INDEX DOCD: CPT | Performed by: INTERNAL MEDICINE

## 2021-04-21 PROCEDURE — 3074F SYST BP LT 130 MM HG: CPT | Performed by: INTERNAL MEDICINE

## 2021-04-21 PROCEDURE — 3079F DIAST BP 80-89 MM HG: CPT | Performed by: INTERNAL MEDICINE

## 2021-04-21 RX ORDER — MUPIROCIN CALCIUM 20 MG/G
CREAM TOPICAL
Qty: 30 G | Refills: 1 | Status: SHIPPED | OUTPATIENT
Start: 2021-04-21 | End: 2021-04-21

## 2021-04-21 NOTE — PATIENT INSTRUCTIONS
Keep using the cream and the antibiotics.   Sometimes cool compress or aloe vera gel can help  You can take benadryl during the night time for itching  Please follow up with the wound clinic as well     You can take over the counter tylenol or ibuprofen for

## 2021-04-21 NOTE — PROGRESS NOTES
Patient Office Visit    ASSESSMENT AND PLAN:   (J32) Hospital discharge follow-up  (primary encounter diagnosis)  Plan: burn with blisters noted. Continue oral antibiotics and silvadene.  Avoid dressing the area as the dressing may be causing an allergic re oil about 5 days ago   - was seen in urgent care and was recommended to take antibiotics and silvadene    - she has noticed a lot of itching and this morning she thought it looked more red   - she has been using a cool compress   - the blisters have not bu Complex-C-Folic Acid (EQL B COMPLEX) Oral Tab, Take by mouth., Disp: , Rfl:   Ascorbic Acid (VITAMIN C) 100 MG Oral Tab, Take 100 mg by mouth daily. , Disp: , Rfl:   Cholecalciferol (VITAMIN D) 2000 units Oral Tab, Take 4,000 Units by mouth daily.   , Disp:

## 2021-04-23 ENCOUNTER — IMMUNIZATION (OUTPATIENT)
Dept: LAB | Age: 66
End: 2021-04-23
Attending: HOSPITALIST
Payer: MEDICAID

## 2021-04-23 DIAGNOSIS — Z23 NEED FOR VACCINATION: Primary | ICD-10-CM

## 2021-04-23 PROCEDURE — 0002A SARSCOV2 VAC 30MCG/0.3ML IM: CPT

## 2021-06-07 ENCOUNTER — TELEPHONE (OUTPATIENT)
Dept: INTERNAL MEDICINE CLINIC | Facility: CLINIC | Age: 66
End: 2021-06-07

## 2021-06-07 DIAGNOSIS — Z12.31 ENCOUNTER FOR SCREENING MAMMOGRAM FOR MALIGNANT NEOPLASM OF BREAST: Primary | ICD-10-CM

## 2021-06-07 NOTE — TELEPHONE ENCOUNTER
Pt requesting mammogram orders to be placed. Pt has physical scheduled in august, but would like to have mammogram done before appointment. Pt requesting a call once they are placed.  Confirmed [phone] as best number,     Future Appointments   Date

## 2021-06-12 ENCOUNTER — HOSPITAL ENCOUNTER (OUTPATIENT)
Dept: MAMMOGRAPHY | Age: 66
Discharge: HOME | End: 2021-06-12
Attending: INTERNAL MEDICINE
Payer: MEDICAID

## 2021-06-12 DIAGNOSIS — Z12.31 ENCOUNTER FOR SCREENING MAMMOGRAM FOR MALIGNANT NEOPLASM OF BREAST: ICD-10-CM

## 2021-06-12 PROCEDURE — 77063 BREAST TOMOSYNTHESIS BI: CPT | Performed by: INTERNAL MEDICINE

## 2021-06-12 PROCEDURE — 77067 SCR MAMMO BI INCL CAD: CPT | Performed by: INTERNAL MEDICINE

## 2021-08-18 ENCOUNTER — OFFICE VISIT (OUTPATIENT)
Dept: INTERNAL MEDICINE CLINIC | Facility: CLINIC | Age: 66
End: 2021-08-18
Payer: MEDICAID

## 2021-08-18 VITALS
OXYGEN SATURATION: 99 % | SYSTOLIC BLOOD PRESSURE: 128 MMHG | TEMPERATURE: 98 F | WEIGHT: 207.81 LBS | HEART RATE: 76 BPM | RESPIRATION RATE: 18 BRPM | BODY MASS INDEX: 36.82 KG/M2 | DIASTOLIC BLOOD PRESSURE: 60 MMHG | HEIGHT: 63 IN

## 2021-08-18 DIAGNOSIS — Z80.0 FAMILY HISTORY OF COLON CANCER: ICD-10-CM

## 2021-08-18 DIAGNOSIS — Z12.31 VISIT FOR SCREENING MAMMOGRAM: ICD-10-CM

## 2021-08-18 DIAGNOSIS — Z12.11 SCREEN FOR COLON CANCER: ICD-10-CM

## 2021-08-18 DIAGNOSIS — G47.33 OSA ON CPAP: ICD-10-CM

## 2021-08-18 DIAGNOSIS — I10 ESSENTIAL HYPERTENSION: ICD-10-CM

## 2021-08-18 DIAGNOSIS — Z78.0 POSTMENOPAUSAL: ICD-10-CM

## 2021-08-18 DIAGNOSIS — Z99.89 OSA ON CPAP: ICD-10-CM

## 2021-08-18 DIAGNOSIS — Z00.00 ROUTINE GENERAL MEDICAL EXAMINATION AT A HEALTH CARE FACILITY: Primary | ICD-10-CM

## 2021-08-18 PROCEDURE — 3074F SYST BP LT 130 MM HG: CPT | Performed by: INTERNAL MEDICINE

## 2021-08-18 PROCEDURE — 3008F BODY MASS INDEX DOCD: CPT | Performed by: INTERNAL MEDICINE

## 2021-08-18 PROCEDURE — 99396 PREV VISIT EST AGE 40-64: CPT | Performed by: INTERNAL MEDICINE

## 2021-08-18 PROCEDURE — 3078F DIAST BP <80 MM HG: CPT | Performed by: INTERNAL MEDICINE

## 2021-08-18 NOTE — PATIENT INSTRUCTIONS
Please complete fasting labs as soon as possible. You are due for your colonoscopy in January, 2022. You are due for your bone density (DEXA) after you turn 74years old.      You can use omeprazole 30 minutes prior to meals you know will worsen you pain?  Wearing shoes with poor cushioning can irritate the tissue in your heel (plantar fascia). Being overweight or standing for long periods can also irritate the tissue.  Running, walking, tennis, and other sports that put stress on the heels can cause t up.  Risk factors for plantar fasciitis include: arthritis, diabetes, obesity or recent weight gain, flat foot, and having high arches.  Wearing high heels, loose shoes, or shoes with poor arch support adds to the risk.    Foot pain is usually worse in the special boot may be needed. Use these as directed by your healthcare provider. Follow-up care   Follow up with your healthcare provider, physical therapist, or foot specialist as advised.   When to seek medical advice  Call your healthcare provider right a with good arch support and shock absorbency. Replace athletic shoes when they become worn out. Don’t walk or run barefoot. · Premade or custom-fitted shoe inserts may be helpful. Inserts made of silicone seem to be the most effective.  Custom-made inserts Always follow your healthcare professional's instructions. Treating Plantar Fasciitis  First, your healthcare provider tries to find out the cause of your problem. He or she can then suggest ways to ease pain.  If your pain is due to poor foot mechan healthcare professional's instructions. Understanding Plantar Fasciitis    Plantar fasciitis is a condition that causes foot and heel pain.  The plantar fascia is a tough band of tissue that runs across the bottom of the foot from the heel to the toe anti-inflammatory drugs) are the most common medicines used. They may be given as pills. Or they may be put on the skin as a gel, cream, or patch. · Using heel cups or foot inserts (orthotics).  These are placed in the shoes to help support the heel or arc professional medical care. Always follow your healthcare professional's instructions.

## 2021-08-18 NOTE — PROGRESS NOTES
Ursula Valenzuela is a 61year old female. HPI:   Patient presents for a physical exam  1. DAVE on cpap: using cpap every night  2. GERD: rarely needing omeprazole. 3. HTN: she has been taking lisinopril every evening.  She does not regularly check home pre 3.2 oz (90.4 kg)        Latex                   HIVES  Wellbutrin [Bupropi*    SHORTNESS OF BREATH  Levofloxacin            SWELLING    Comment:Joint pain and swelling    Family History   Problem Relation Age of Onset   • Heart Attack Brother    • Cancer B Stress: she is adamantly against any medications for mood. She strongly feels her mood will be improved if she loses weight.   - has tried 3 other anti-depressants in past (cannot recall names) which did not help. Most recently tried wellbutrin.    - juanito in 6 weeks for weight management and nutrition discussion.    Max Briseno MD

## 2021-08-19 ENCOUNTER — LAB ENCOUNTER (OUTPATIENT)
Dept: LAB | Age: 66
End: 2021-08-19
Attending: INTERNAL MEDICINE
Payer: MEDICAID

## 2021-08-19 DIAGNOSIS — I10 ESSENTIAL HYPERTENSION: ICD-10-CM

## 2021-08-19 DIAGNOSIS — Z00.00 ROUTINE GENERAL MEDICAL EXAMINATION AT A HEALTH CARE FACILITY: ICD-10-CM

## 2021-08-19 DIAGNOSIS — R21 SKIN RASH: ICD-10-CM

## 2021-08-19 LAB
ALT SERPL-CCNC: 41 U/L
ANION GAP SERPL CALC-SCNC: 5 MMOL/L (ref 0–18)
AST SERPL-CCNC: 22 U/L (ref 15–37)
BASOPHILS # BLD AUTO: 0.03 X10(3) UL (ref 0–0.2)
BASOPHILS NFR BLD AUTO: 0.4 %
BUN BLD-MCNC: 13 MG/DL (ref 7–18)
CALCIUM BLD-MCNC: 9.6 MG/DL (ref 8.5–10.1)
CHLORIDE SERPL-SCNC: 111 MMOL/L (ref 98–112)
CHOLEST SMN-MCNC: 196 MG/DL (ref ?–200)
CO2 SERPL-SCNC: 25 MMOL/L (ref 21–32)
CREAT BLD-MCNC: 0.76 MG/DL
EOSINOPHIL # BLD AUTO: 0.05 X10(3) UL (ref 0–0.7)
EOSINOPHIL NFR BLD AUTO: 0.7 %
ERYTHROCYTE [DISTWIDTH] IN BLOOD BY AUTOMATED COUNT: 13.3 %
EST. AVERAGE GLUCOSE BLD GHB EST-MCNC: 134 MG/DL (ref 68–126)
GLUCOSE BLD-MCNC: 109 MG/DL (ref 70–99)
HBA1C MFR BLD HPLC: 6.3 % (ref ?–5.7)
HCT VFR BLD AUTO: 42.9 %
HDLC SERPL-MCNC: 83 MG/DL (ref 40–59)
HGB BLD-MCNC: 13.7 G/DL
IMM GRANULOCYTES # BLD AUTO: 0.02 X10(3) UL (ref 0–1)
IMM GRANULOCYTES NFR BLD: 0.3 %
LDLC SERPL CALC-MCNC: 95 MG/DL (ref ?–100)
LYMPHOCYTES # BLD AUTO: 2.32 X10(3) UL (ref 1–4)
LYMPHOCYTES NFR BLD AUTO: 32 %
MCH RBC QN AUTO: 26.2 PG (ref 26–34)
MCHC RBC AUTO-ENTMCNC: 31.9 G/DL (ref 31–37)
MCV RBC AUTO: 82 FL
MONOCYTES # BLD AUTO: 0.67 X10(3) UL (ref 0.1–1)
MONOCYTES NFR BLD AUTO: 9.3 %
NEUTROPHILS # BLD AUTO: 4.15 X10 (3) UL (ref 1.5–7.7)
NEUTROPHILS # BLD AUTO: 4.15 X10(3) UL (ref 1.5–7.7)
NEUTROPHILS NFR BLD AUTO: 57.3 %
NONHDLC SERPL-MCNC: 113 MG/DL (ref ?–130)
OSMOLALITY SERPL CALC.SUM OF ELEC: 293 MOSM/KG (ref 275–295)
PLATELET # BLD AUTO: 293 10(3)UL (ref 150–450)
POTASSIUM SERPL-SCNC: 4.4 MMOL/L (ref 3.5–5.1)
RBC # BLD AUTO: 5.23 X10(6)UL
SODIUM SERPL-SCNC: 141 MMOL/L (ref 136–145)
TRIGL SERPL-MCNC: 103 MG/DL (ref 30–149)
VLDLC SERPL CALC-MCNC: 17 MG/DL (ref 0–30)
WBC # BLD AUTO: 7.2 X10(3) UL (ref 4–11)

## 2021-08-19 PROCEDURE — 83036 HEMOGLOBIN GLYCOSYLATED A1C: CPT

## 2021-08-19 PROCEDURE — 80048 BASIC METABOLIC PNL TOTAL CA: CPT

## 2021-08-19 PROCEDURE — 84460 ALANINE AMINO (ALT) (SGPT): CPT

## 2021-08-19 PROCEDURE — 36415 COLL VENOUS BLD VENIPUNCTURE: CPT

## 2021-08-19 PROCEDURE — 80061 LIPID PANEL: CPT

## 2021-08-19 PROCEDURE — 84450 TRANSFERASE (AST) (SGOT): CPT

## 2021-08-19 PROCEDURE — 85025 COMPLETE CBC W/AUTO DIFF WBC: CPT

## 2021-08-27 ENCOUNTER — TELEPHONE (OUTPATIENT)
Dept: INTERNAL MEDICINE CLINIC | Facility: CLINIC | Age: 66
End: 2021-08-27

## 2021-08-27 NOTE — TELEPHONE ENCOUNTER
Pt states she started metformin on Monday. Since then she has noticed fatigue which lasts 4-5 hours after taking medication. No other s/e or s/s. She is also concerned with medication stating it could effect kidneys.      I informed pt that when startin

## 2021-08-27 NOTE — TELEPHONE ENCOUNTER
Patient was recently prescribed metFORMIN HCl 500 MG Oral Tab and she is feeling very lethargic. She was also reading the side effects and it states that it can cause an issue with your kidneys.  Patient would like to speak with a nurse to see if she should

## 2021-10-04 ENCOUNTER — OFFICE VISIT (OUTPATIENT)
Dept: INTERNAL MEDICINE CLINIC | Facility: CLINIC | Age: 66
End: 2021-10-04
Payer: MEDICAID

## 2021-10-04 VITALS
SYSTOLIC BLOOD PRESSURE: 118 MMHG | HEART RATE: 76 BPM | TEMPERATURE: 98 F | DIASTOLIC BLOOD PRESSURE: 70 MMHG | RESPIRATION RATE: 16 BRPM | WEIGHT: 200 LBS | BODY MASS INDEX: 35.44 KG/M2 | HEIGHT: 63 IN | OXYGEN SATURATION: 98 %

## 2021-10-04 DIAGNOSIS — R07.89 CHEST TIGHTNESS: ICD-10-CM

## 2021-10-04 DIAGNOSIS — R06.00 DOE (DYSPNEA ON EXERTION): ICD-10-CM

## 2021-10-04 DIAGNOSIS — R00.2 PALPITATIONS: ICD-10-CM

## 2021-10-04 DIAGNOSIS — Z23 NEED FOR IMMUNIZATION AGAINST INFLUENZA: ICD-10-CM

## 2021-10-04 DIAGNOSIS — E66.09 CLASS 1 OBESITY DUE TO EXCESS CALORIES WITH SERIOUS COMORBIDITY AND BODY MASS INDEX (BMI) OF 33.0 TO 33.9 IN ADULT: ICD-10-CM

## 2021-10-04 DIAGNOSIS — I10 ESSENTIAL HYPERTENSION: Primary | ICD-10-CM

## 2021-10-04 PROCEDURE — 3074F SYST BP LT 130 MM HG: CPT | Performed by: INTERNAL MEDICINE

## 2021-10-04 PROCEDURE — 3078F DIAST BP <80 MM HG: CPT | Performed by: INTERNAL MEDICINE

## 2021-10-04 PROCEDURE — 90686 IIV4 VACC NO PRSV 0.5 ML IM: CPT | Performed by: INTERNAL MEDICINE

## 2021-10-04 PROCEDURE — 3008F BODY MASS INDEX DOCD: CPT | Performed by: INTERNAL MEDICINE

## 2021-10-04 PROCEDURE — 93000 ELECTROCARDIOGRAM COMPLETE: CPT | Performed by: INTERNAL MEDICINE

## 2021-10-04 PROCEDURE — 99214 OFFICE O/P EST MOD 30 MIN: CPT | Performed by: INTERNAL MEDICINE

## 2021-10-04 PROCEDURE — 90471 IMMUNIZATION ADMIN: CPT | Performed by: INTERNAL MEDICINE

## 2021-10-04 NOTE — PROGRESS NOTES
Shaq Khan is a 61year old female. HPI:   Patient presents for the following issues. 1. Obesity: she is tolerating metformin 500 mg BID very well. She has lost 7 lbs since last visit. She has made many nutrition changes and walking 4 days/week.  She (obstructive sleep apnea) 7/21/2020 ESC HST    AHI 18 Supine AHI 22 non-supine AHI 9 Sao2 Van 66%   • Vertigo    • Vitamin D deficiency       Past Surgical History:   Procedure Laterality Date   • OTHER      uterine cyst removed   • TONSILLECTOMY (cannot recall names) which did not help. Most recently tried wellbutrin. - declines counseling. # Overflow Stress Urinary Incontinence, Urge urinary incontinence: stable. Has completed HEP and PT.    # Deviated Septum and Left hearing impairment: she d

## 2021-10-04 NOTE — PATIENT INSTRUCTIONS
Please e-mail me your weight in 4 weeks and let me know if you feel we should add another medication to boost your metabolism such as phentermine or topamax. For now, please increase metformin to 1000 mg twice daily.

## 2021-10-06 ENCOUNTER — LAB ENCOUNTER (OUTPATIENT)
Dept: LAB | Age: 66
End: 2021-10-06
Attending: INTERNAL MEDICINE
Payer: MEDICAID

## 2021-10-06 DIAGNOSIS — E66.09 CLASS 1 OBESITY DUE TO EXCESS CALORIES WITH SERIOUS COMORBIDITY AND BODY MASS INDEX (BMI) OF 33.0 TO 33.9 IN ADULT: ICD-10-CM

## 2021-10-06 PROCEDURE — 84439 ASSAY OF FREE THYROXINE: CPT

## 2021-10-06 PROCEDURE — 84443 ASSAY THYROID STIM HORMONE: CPT

## 2021-10-06 PROCEDURE — 36415 COLL VENOUS BLD VENIPUNCTURE: CPT

## 2021-10-09 ENCOUNTER — HOSPITAL ENCOUNTER (OUTPATIENT)
Dept: BONE DENSITY | Age: 66
Discharge: HOME | End: 2021-10-09
Attending: INTERNAL MEDICINE
Payer: MEDICAID

## 2021-10-09 ENCOUNTER — OFFICE VISIT (OUTPATIENT)
Dept: FAMILY MEDICINE CLINIC | Facility: CLINIC | Age: 66
End: 2021-10-09
Payer: MEDICAID

## 2021-10-09 VITALS
HEART RATE: 78 BPM | DIASTOLIC BLOOD PRESSURE: 80 MMHG | RESPIRATION RATE: 16 BRPM | BODY MASS INDEX: 35.08 KG/M2 | OXYGEN SATURATION: 98 % | SYSTOLIC BLOOD PRESSURE: 118 MMHG | TEMPERATURE: 98 F | HEIGHT: 63 IN | WEIGHT: 198 LBS

## 2021-10-09 DIAGNOSIS — Z78.0 POSTMENOPAUSAL: ICD-10-CM
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
## 2021-10-09 PROCEDURE — 3074F SYST BP LT 130 MM HG: CPT | Performed by: NURSE PRACTITIONER

## 2021-10-09 PROCEDURE — 77080 DXA BONE DENSITY AXIAL: CPT | Performed by: INTERNAL MEDICINE

## 2021-10-09 PROCEDURE — 3008F BODY MASS INDEX DOCD: CPT | Performed by: NURSE PRACTITIONER

## 2021-10-09 PROCEDURE — 99213 OFFICE O/P EST LOW 20 MIN: CPT | Performed by: NURSE PRACTITIONER

## 2021-10-09 PROCEDURE — 3079F DIAST BP 80-89 MM HG: CPT | Performed by: NURSE PRACTITIONER

## 2021-10-09 NOTE — PATIENT INSTRUCTIONS
Insect Bite  Some insects sting to protect themselves or their nests. These include bees, wasps, ants, and hornets. A sting causes a sharp burning pain. Other insects bite to feed. These include fleas, bedbugs, and mosquitoes.  In some cases, the actual b itching and swelling. Use each medicine according to the directions on the package. If the sting or bite gets infected, you will need an antibiotic. This may be in pill form taken by mouth. Or it may be as an ointment or cream put directly on the skin.  Be

## 2021-10-09 NOTE — PROGRESS NOTES
CHIEF COMPLAINT:   Patient presents with:  Bite Sting,Insect: 2 days      HPI:     Luciano Rivera is a 61year old female who presents with concerns of infected bug bite. Patient first noticed symptoms 2 days ago.   Reports erythema, increased warmth and itch No       REVIEW OF SYSTEMS:   GENERAL: feels well otherwise, no fever, no chills. SKIN: as above. CHEST: no chest pains, no palpitations. LUNGS: denies shortness of breath with exertion or rest. No wheezing, no cough.   LYMPH: no enlargement of the lymph wasps, ants, and hornets. A sting causes a sharp burning pain. Other insects bite to feed. These include fleas, bedbugs, and mosquitoes. In some cases, the actual bite causes no pain. But after the bite there may be a local reaction.  Both bites and stings sting or bite gets infected, you will need an antibiotic. This may be in pill form taken by mouth. Or it may be as an ointment or cream put directly on the skin. Be sure to use them exactly as prescribed.   · Bite symptoms often go away on their own in a we

## 2021-10-21 ENCOUNTER — HOSPITAL ENCOUNTER (OUTPATIENT)
Dept: CV DIAGNOSTICS | Age: 66
Discharge: HOME | End: 2021-10-21
Attending: INTERNAL MEDICINE
Payer: MEDICAID

## 2021-10-21 DIAGNOSIS — R07.89 CHEST TIGHTNESS: ICD-10-CM

## 2021-10-21 DIAGNOSIS — R00.2 PALPITATIONS: ICD-10-CM

## 2021-10-21 DIAGNOSIS — R06.00 DOE (DYSPNEA ON EXERTION): ICD-10-CM

## 2021-10-21 PROCEDURE — 93017 CV STRESS TEST TRACING ONLY: CPT | Performed by: INTERNAL MEDICINE

## 2021-10-21 PROCEDURE — 78452 HT MUSCLE IMAGE SPECT MULT: CPT | Performed by: INTERNAL MEDICINE

## 2021-10-21 PROCEDURE — 93018 CV STRESS TEST I&R ONLY: CPT | Performed by: INTERNAL MEDICINE

## 2021-10-26 ENCOUNTER — HOSPITAL ENCOUNTER (OUTPATIENT)
Dept: CV DIAGNOSTICS | Age: 66
Discharge: HOME | End: 2021-10-26
Attending: INTERNAL MEDICINE
Payer: MEDICAID

## 2021-10-26 DIAGNOSIS — R06.00 DOE (DYSPNEA ON EXERTION): ICD-10-CM

## 2021-10-26 DIAGNOSIS — R00.2 PALPITATIONS: ICD-10-CM

## 2021-10-26 DIAGNOSIS — R07.89 CHEST TIGHTNESS: ICD-10-CM

## 2021-10-26 PROCEDURE — 93227 XTRNL ECG REC<48 HR R&I: CPT | Performed by: INTERNAL MEDICINE

## 2021-10-26 PROCEDURE — 93225 XTRNL ECG REC<48 HRS REC: CPT | Performed by: INTERNAL MEDICINE

## 2022-02-25 RX ORDER — LISINOPRIL 5 MG/1
5 TABLET ORAL DAILY
Qty: 90 TABLET | Refills: 0 | Status: SHIPPED | OUTPATIENT
Start: 2022-02-25 | End: 2022-04-05

## 2022-04-04 ENCOUNTER — LAB ENCOUNTER (OUTPATIENT)
Dept: LAB | Age: 67
End: 2022-04-04
Attending: INTERNAL MEDICINE
Payer: MEDICAID

## 2022-04-04 DIAGNOSIS — G47.33 OSA ON CPAP: ICD-10-CM

## 2022-04-04 DIAGNOSIS — R73.01 IMPAIRED FASTING GLUCOSE: ICD-10-CM

## 2022-04-04 DIAGNOSIS — I10 ESSENTIAL HYPERTENSION: Primary | ICD-10-CM

## 2022-04-04 DIAGNOSIS — Z99.89 OSA ON CPAP: ICD-10-CM

## 2022-04-04 LAB
ALT SERPL-CCNC: 38 U/L
ANION GAP SERPL CALC-SCNC: 4 MMOL/L (ref 0–18)
AST SERPL-CCNC: 17 U/L (ref 15–37)
BUN BLD-MCNC: 14 MG/DL (ref 7–18)
CALCIUM BLD-MCNC: 9.6 MG/DL (ref 8.5–10.1)
CHLORIDE SERPL-SCNC: 107 MMOL/L (ref 98–112)
CHOLEST SERPL-MCNC: 185 MG/DL (ref ?–200)
CO2 SERPL-SCNC: 28 MMOL/L (ref 21–32)
CREAT BLD-MCNC: 0.72 MG/DL
EST. AVERAGE GLUCOSE BLD GHB EST-MCNC: 120 MG/DL (ref 68–126)
FASTING PATIENT LIPID ANSWER: YES
FASTING STATUS PATIENT QL REPORTED: YES
HBA1C MFR BLD: 5.8 % (ref ?–5.7)
HDLC SERPL-MCNC: 87 MG/DL (ref 40–59)
LDLC SERPL CALC-MCNC: 75 MG/DL (ref ?–100)
NONHDLC SERPL-MCNC: 98 MG/DL (ref ?–130)
OSMOLALITY SERPL CALC.SUM OF ELEC: 288 MOSM/KG (ref 275–295)
POTASSIUM SERPL-SCNC: 5.1 MMOL/L (ref 3.5–5.1)
SODIUM SERPL-SCNC: 139 MMOL/L (ref 136–145)
TRIGL SERPL-MCNC: 136 MG/DL (ref 30–149)
VLDLC SERPL CALC-MCNC: 21 MG/DL (ref 0–30)

## 2022-04-04 PROCEDURE — 80048 BASIC METABOLIC PNL TOTAL CA: CPT | Performed by: INTERNAL MEDICINE

## 2022-04-04 PROCEDURE — 84460 ALANINE AMINO (ALT) (SGPT): CPT | Performed by: INTERNAL MEDICINE

## 2022-04-04 PROCEDURE — 80061 LIPID PANEL: CPT

## 2022-04-04 PROCEDURE — 36415 COLL VENOUS BLD VENIPUNCTURE: CPT

## 2022-04-04 PROCEDURE — 83036 HEMOGLOBIN GLYCOSYLATED A1C: CPT | Performed by: INTERNAL MEDICINE

## 2022-04-04 PROCEDURE — 84450 TRANSFERASE (AST) (SGOT): CPT | Performed by: INTERNAL MEDICINE

## 2022-04-05 RX ORDER — LISINOPRIL 5 MG/1
TABLET ORAL
Qty: 90 TABLET | Refills: 0 | Status: SHIPPED | OUTPATIENT
Start: 2022-04-05

## 2022-04-05 NOTE — TELEPHONE ENCOUNTER
Protocol passed     Requesting: Lisinopril 5mg     LOV: 10/4/21   # Essential HTN: well controlled on lisinopril  RTC: 3 months   Filled: 2/25/22 #90 0 refills   Recent Labs: 4/4/22     Upcoming OV: 5/13/22

## 2022-05-13 ENCOUNTER — LAB ENCOUNTER (OUTPATIENT)
Dept: LAB | Age: 67
End: 2022-05-13
Attending: INTERNAL MEDICINE
Payer: MEDICAID

## 2022-05-13 ENCOUNTER — OFFICE VISIT (OUTPATIENT)
Dept: INTERNAL MEDICINE CLINIC | Facility: CLINIC | Age: 67
End: 2022-05-13
Payer: MEDICAID

## 2022-05-13 VITALS
HEIGHT: 63.5 IN | DIASTOLIC BLOOD PRESSURE: 80 MMHG | BODY MASS INDEX: 33.91 KG/M2 | SYSTOLIC BLOOD PRESSURE: 122 MMHG | HEART RATE: 64 BPM | OXYGEN SATURATION: 98 % | TEMPERATURE: 98 F | RESPIRATION RATE: 16 BRPM | WEIGHT: 193.81 LBS

## 2022-05-13 DIAGNOSIS — G47.33 OSA ON CPAP: ICD-10-CM

## 2022-05-13 DIAGNOSIS — Z99.89 OSA ON CPAP: ICD-10-CM

## 2022-05-13 DIAGNOSIS — E66.09 CLASS 1 OBESITY DUE TO EXCESS CALORIES WITH SERIOUS COMORBIDITY AND BODY MASS INDEX (BMI) OF 33.0 TO 33.9 IN ADULT: ICD-10-CM

## 2022-05-13 DIAGNOSIS — Z80.0 FAMILY HISTORY OF COLON CANCER: ICD-10-CM

## 2022-05-13 DIAGNOSIS — I10 ESSENTIAL HYPERTENSION: Primary | ICD-10-CM

## 2022-05-13 DIAGNOSIS — M85.852 OSTEOPENIA OF NECK OF LEFT FEMUR: ICD-10-CM

## 2022-05-13 DIAGNOSIS — E55.9 VITAMIN D DEFICIENCY: ICD-10-CM

## 2022-05-13 DIAGNOSIS — Z12.11 SCREEN FOR COLON CANCER: ICD-10-CM

## 2022-05-13 LAB — VIT D+METAB SERPL-MCNC: 27.6 NG/ML (ref 30–100)

## 2022-05-13 PROCEDURE — 3079F DIAST BP 80-89 MM HG: CPT | Performed by: INTERNAL MEDICINE

## 2022-05-13 PROCEDURE — 99214 OFFICE O/P EST MOD 30 MIN: CPT | Performed by: INTERNAL MEDICINE

## 2022-05-13 PROCEDURE — 3008F BODY MASS INDEX DOCD: CPT | Performed by: INTERNAL MEDICINE

## 2022-05-13 PROCEDURE — 3074F SYST BP LT 130 MM HG: CPT | Performed by: INTERNAL MEDICINE

## 2022-05-13 PROCEDURE — 82306 VITAMIN D 25 HYDROXY: CPT | Performed by: INTERNAL MEDICINE

## 2022-05-13 PROCEDURE — 90670 PCV13 VACCINE IM: CPT | Performed by: INTERNAL MEDICINE

## 2022-05-13 PROCEDURE — 36415 COLL VENOUS BLD VENIPUNCTURE: CPT | Performed by: INTERNAL MEDICINE

## 2022-05-13 PROCEDURE — 90471 IMMUNIZATION ADMIN: CPT | Performed by: INTERNAL MEDICINE

## 2022-05-13 RX ORDER — PHENTERMINE HYDROCHLORIDE 37.5 MG/1
37.5 TABLET ORAL
Qty: 30 TABLET | Refills: 1 | Status: SHIPPED | OUTPATIENT
Start: 2022-05-13

## 2022-05-13 RX ORDER — ALENDRONATE SODIUM 70 MG/1
70 TABLET ORAL
Qty: 12 TABLET | Refills: 3 | Status: SHIPPED | OUTPATIENT
Start: 2022-05-13

## 2022-05-13 RX ORDER — PHENTERMINE HYDROCHLORIDE 15 MG/1
15 CAPSULE ORAL EVERY MORNING
Qty: 7 CAPSULE | Refills: 0 | Status: SHIPPED | OUTPATIENT
Start: 2022-05-13

## 2022-05-13 RX ORDER — LISINOPRIL 5 MG/1
5 TABLET ORAL DAILY
Qty: 90 TABLET | Refills: 3 | Status: SHIPPED | OUTPATIENT
Start: 2022-05-13

## 2022-05-13 NOTE — PATIENT INSTRUCTIONS
Please do not exceed 3000 mg (3 grams) of tylenol in 24 hours. It is very important you look at the amount of tylenol (acetaminophen) in any of your over the counter medications to ensure you do not exceed a safe amount of tylenol per day.     Please wait to start the fosamax until I tell you if your vitamin D level is normal.

## 2022-05-15 RX ORDER — ERGOCALCIFEROL 1.25 MG/1
50000 CAPSULE ORAL WEEKLY
Qty: 8 CAPSULE | Refills: 0 | Status: SHIPPED | OUTPATIENT
Start: 2022-05-15 | End: 2022-06-14

## 2022-06-02 ENCOUNTER — TELEMEDICINE (OUTPATIENT)
Dept: INTERNAL MEDICINE CLINIC | Facility: CLINIC | Age: 67
End: 2022-06-02
Payer: MEDICAID

## 2022-06-02 DIAGNOSIS — R09.81 NASAL CONGESTION: Primary | ICD-10-CM

## 2022-06-02 DIAGNOSIS — R09.82 POST-NASAL DRIP: ICD-10-CM

## 2022-06-02 PROCEDURE — 99213 OFFICE O/P EST LOW 20 MIN: CPT | Performed by: INTERNAL MEDICINE

## 2022-06-02 RX ORDER — AZELASTINE 1 MG/ML
1 SPRAY, METERED NASAL 2 TIMES DAILY
Qty: 30 ML | Refills: 0 | Status: SHIPPED | OUTPATIENT
Start: 2022-06-02

## 2022-06-02 NOTE — PROGRESS NOTES
Meryl Grimes is a 74year old female here today for a telemedicine/video visit. Patient is in the state of [place] during this visit. Meryl Grimes verbally consents to a Video visit service on 06/02/22. Patient understands and accepts financial responsibility for any deductible, co-insurance and/or co-pays associated with this service. Duration of the service: 4 minutes  Start time: 8:50 AM  End time: 8:54 AM    HPI:  Patient has been dealing with post-nasal drip, nasal congestion, sore throat for past 3-4 days. Some associated fatigue. Did have a COVID exposure but tested negative for COVID.  did test positive for COVID. Nocturnal cough. Past medical, surgical, family, and social histories were reviewed and are unchanged from previous visit. ROS:  GENERAL: fatigue; denies fevers/chills/sweats  SKIN: denies any unusual skin lesions  EYES: denies blurred vision or double vision  HEENT: nasal congestion, post-nasal drip, sore throat  LUNGS: denies shortness of breath with exertion  CARDIOVASCULAR: denies chest pain on exertion  GI: denies abdominal pain, denies heartburn, denies diarrhea  MUSCULOSKELETAL: denies back pain, denies body aches  NEURO: denies headaches    EXAM:  GENERAL: Alert and oriented, well developed, well nourished,in no apparent distress  SKIN: no rashes,no suspicious lesions of face  HEENT: atraumatic, EOMI, normal lid and conjunctiva  NECK: no grossly visible jvd or thyromegaly  LUNGS: speaking in full sentences, no increased work of breathing, no audible wheezing  NEURO: alert and oriented x 3  PSYCH: pleasant, appropriate mood and affect    ASSESSMENT/PLAN:  1. Nasal congestion  2. Post-nasal drip  Patient with nasal congestion, sore throat, post-nasal drip for several days now. Negative COVID test, though she has had COVID exposure a week ago.  with COVID but he is isolating. Patient with no constitutional symptoms. Will start on azelastine nasal spray. Continue OTC antihistamine. Advised patient to contact us if symptoms persist/worsen - may consider antibiotics, repeat COVID testing at that point.  - azelastine 0.1 % Nasal Solution; 1 spray by Nasal route 2 (two) times daily. Dispense: 30 mL; Refill: 0    [facility] issues, or earlier as needed for acute issues. Please note that the following visit was completed using two-way, real-time interactive audio and video communication. This has been done in good vivian to provide continuity of care in the best interest of the provider-patient relationship, due to the ongoing public health crisis/national emergency and because of restrictions of visitation. There are limitations of this visit as a complete head to toe physical exam could not be performed. Every conscious effort was taken to allow for sufficient and adequate time. This billing was spent on reviewing labs, medications, radiology tests and decision making. Appropriate medical decision-making and tests are ordered as detailed in the plan of care above.

## 2022-08-17 ENCOUNTER — TELEPHONE (OUTPATIENT)
Dept: INTERNAL MEDICINE CLINIC | Facility: CLINIC | Age: 67
End: 2022-08-17

## 2022-08-17 NOTE — TELEPHONE ENCOUNTER
Spoke to pt to inform her we will let insurance company know that she is Pre-DM. Never had a DM dx.  Pt v/u

## 2022-08-17 NOTE — TELEPHONE ENCOUNTER
Spoke with Alexx Rae at [facility]. Informed her she is Pre-DM and takes Metformin. Also stated she has essential hypertension, taking lisinopril. Confirmed our office fax # to send us forms.

## 2022-08-17 NOTE — TELEPHONE ENCOUNTER
Spoke with pt to confirm that she was switching insurance and if it was OK to release her information to [employer]. Pt stated they did enroll in [employer] and she stated on the forms that she is Diabetic. Informed her that her HgbA1C is in pre-diabetic range, but she does not have a true diagnosis of Diabetes. Pt is taking metformin and phentermine for weight loss. Please advise as to what diagnosis to give to insurance company.

## 2022-08-17 NOTE — TELEPHONE ENCOUNTER
Victorina Bingham with [employer] wanted to confirm if pt was diabetic. Victorina Bingham stated it was for pt to enroll in their [facility]. Victorina Bingham [phone] reference #[id number]. Victorina Bingham stated they will also fax over request to office.

## 2022-10-04 ENCOUNTER — OFFICE VISIT (OUTPATIENT)
Dept: INTERNAL MEDICINE CLINIC | Facility: CLINIC | Age: 67
End: 2022-10-04
Payer: MEDICARE

## 2022-10-04 VITALS
HEART RATE: 61 BPM | OXYGEN SATURATION: 99 % | DIASTOLIC BLOOD PRESSURE: 82 MMHG | HEIGHT: 62.79 IN | SYSTOLIC BLOOD PRESSURE: 150 MMHG | WEIGHT: 200.38 LBS | RESPIRATION RATE: 16 BRPM | TEMPERATURE: 98 F | BODY MASS INDEX: 35.95 KG/M2

## 2022-10-04 DIAGNOSIS — Z00.00 ROUTINE GENERAL MEDICAL EXAMINATION AT A HEALTH CARE FACILITY: Primary | ICD-10-CM

## 2022-10-04 DIAGNOSIS — Z99.89 OSA ON CPAP: ICD-10-CM

## 2022-10-04 DIAGNOSIS — Z80.0 FAMILY HISTORY OF COLON CANCER: ICD-10-CM

## 2022-10-04 DIAGNOSIS — E55.9 VITAMIN D DEFICIENCY: ICD-10-CM

## 2022-10-04 DIAGNOSIS — G47.33 OSA ON CPAP: ICD-10-CM

## 2022-10-04 DIAGNOSIS — F33.1 MODERATE EPISODE OF RECURRENT MAJOR DEPRESSIVE DISORDER (HCC): ICD-10-CM

## 2022-10-04 DIAGNOSIS — Z12.31 VISIT FOR SCREENING MAMMOGRAM: ICD-10-CM

## 2022-10-04 DIAGNOSIS — I10 ESSENTIAL HYPERTENSION: ICD-10-CM

## 2022-10-04 DIAGNOSIS — E66.01 CLASS 2 SEVERE OBESITY DUE TO EXCESS CALORIES WITH SERIOUS COMORBIDITY AND BODY MASS INDEX (BMI) OF 35.0 TO 35.9 IN ADULT (HCC): ICD-10-CM

## 2022-10-04 DIAGNOSIS — M85.852 OSTEOPENIA OF NECK OF LEFT FEMUR: ICD-10-CM

## 2022-10-04 PROCEDURE — 99397 PER PM REEVAL EST PAT 65+ YR: CPT | Performed by: INTERNAL MEDICINE

## 2022-10-04 PROCEDURE — G0009 ADMIN PNEUMOCOCCAL VACCINE: HCPCS | Performed by: INTERNAL MEDICINE

## 2022-10-04 PROCEDURE — 3077F SYST BP >= 140 MM HG: CPT | Performed by: INTERNAL MEDICINE

## 2022-10-04 PROCEDURE — 90662 IIV NO PRSV INCREASED AG IM: CPT | Performed by: INTERNAL MEDICINE

## 2022-10-04 PROCEDURE — 96160 PT-FOCUSED HLTH RISK ASSMT: CPT | Performed by: INTERNAL MEDICINE

## 2022-10-04 PROCEDURE — G0402 INITIAL PREVENTIVE EXAM: HCPCS | Performed by: INTERNAL MEDICINE

## 2022-10-04 PROCEDURE — 3008F BODY MASS INDEX DOCD: CPT | Performed by: INTERNAL MEDICINE

## 2022-10-04 PROCEDURE — 3079F DIAST BP 80-89 MM HG: CPT | Performed by: INTERNAL MEDICINE

## 2022-10-04 PROCEDURE — G0008 ADMIN INFLUENZA VIRUS VAC: HCPCS | Performed by: INTERNAL MEDICINE

## 2022-10-04 PROCEDURE — 90677 PCV20 VACCINE IM: CPT | Performed by: INTERNAL MEDICINE

## 2022-10-06 ENCOUNTER — TELEPHONE (OUTPATIENT)
Dept: INTERNAL MEDICINE CLINIC | Facility: CLINIC | Age: 67
End: 2022-10-06

## 2022-10-06 RX ORDER — LISINOPRIL 10 MG/1
10 TABLET ORAL EVERY EVENING
Qty: 7 TABLET | Refills: 0 | COMMUNITY
Start: 2022-10-06 | End: 2022-10-20

## 2022-10-06 NOTE — TELEPHONE ENCOUNTER
KS: please see BP readings below. Spoke with pt. She stated that she has been taking lisinopril 5 mg in the evening. Same \"head pressure\" and nausea as she had at [address] on 10/4. Pt stated it's tolerable. Pt stopped taking metformin. Pt took BP reading again around 9:15am today  2 x's 134/82. Pt seemed calmer that it had gone down and says \"she feels better\". Pt is aware KS is out to office today, Told pt I would route BP readings to KS and we will call her back tomorrow with Dr. Brittney Santillan. Pt v/u.

## 2022-10-06 NOTE — TELEPHONE ENCOUNTER
Patient calling to report her blood pressures since her last appt. Also reports feeling nauseated and having a headache w pressure.     10/04  10pm 146/89   10:02pm 147/92    10/05  8am 146/91   8:02am 148/93    10pm 137/87    10:02pm 138/87    10/06   8am 142/90    8:02am 152/87

## 2022-10-06 NOTE — TELEPHONE ENCOUNTER
Spoke with pt. Informed her of dose increase and to keep track of BP readings, and do labs one week later. Pt confirmed she'll have them done at [address].    Pt v/u.

## 2022-10-06 NOTE — TELEPHONE ENCOUNTER
I recommend she increase her lisinopril to 10 mg nightly and communicate pressures to me again in 1 week. Needs to complete labs 1 week after increasing lisinopril dose.

## 2022-10-13 ENCOUNTER — HOSPITAL ENCOUNTER (OUTPATIENT)
Dept: MAMMOGRAPHY | Age: 67
Discharge: HOME | End: 2022-10-13
Attending: INTERNAL MEDICINE
Payer: MEDICARE

## 2022-10-13 DIAGNOSIS — Z12.31 VISIT FOR SCREENING MAMMOGRAM: ICD-10-CM

## 2022-10-13 PROCEDURE — 77063 BREAST TOMOSYNTHESIS BI: CPT | Performed by: INTERNAL MEDICINE

## 2022-10-13 PROCEDURE — 77067 SCR MAMMO BI INCL CAD: CPT | Performed by: INTERNAL MEDICINE

## 2022-10-18 DIAGNOSIS — E87.5 HYPERKALEMIA: Primary | ICD-10-CM

## 2022-10-18 LAB
ALT: 31 U/L (ref 6–29)
AST: 21 U/L (ref 10–35)
BUN: 13 MG/DL (ref 7–25)
CALCIUM: 9.6 MG/DL (ref 8.6–10.4)
CARBON DIOXIDE: 28 MMOL/L (ref 20–32)
CHLORIDE: 105 MMOL/L (ref 98–110)
CREATININE: 0.74 MG/DL (ref 0.5–1.05)
EGFR: 90 ML/MIN/1.73M2
GLUCOSE: 115 MG/DL (ref 65–99)
HEMOGLOBIN A1C: 5.7 % OF TOTAL HGB
POTASSIUM: 5.6 MMOL/L (ref 3.5–5.3)
SODIUM: 140 MMOL/L (ref 135–146)
VITAMIN D, 25-OH, TOTAL: 38 NG/ML (ref 30–100)

## 2022-10-19 ENCOUNTER — TELEPHONE (OUTPATIENT)
Dept: INTERNAL MEDICINE CLINIC | Facility: CLINIC | Age: 67
End: 2022-10-19

## 2022-10-19 NOTE — TELEPHONE ENCOUNTER
Pt calling to provide update. She has not received a call from [facility] regarding the injections for osteoporosis. Also, pt has been taking the lisinopril 10 MG for a week and her cholesterol is still high in the 150 range. F/u to discuss furthermore.

## 2022-10-19 NOTE — TELEPHONE ENCOUNTER
Clarified with patient, her b/p has been between 143-150 since starting Lisinopril 10mg. She's been having a slight headache and a little shortness of breath. Denies CP and swelling. Any recommendations? She also mention KS was going to submit to insurance for patient to get a shot for osteopenia injection. She states KS informed her a call should come from hospital today with more information. Please advise. Thank you! LOV 10/4/22  ASSESSMENT AND PLAN:   # Osteopenia of Femoral Neck - FRAX score is high. She is experiencing heartburn on fosamax so we will ask for approval for reclast.   - educated on dietary calcium/vti D3, weight bearing exercises  - mother broke hip and she broke her foot as an adult. Thus FRAX is 26% for major fracture.

## 2022-10-20 NOTE — TELEPHONE ENCOUNTER
Spoke with pt. Informed her of Dr. Skye Ledesma to increase lisinopril to 20mg and keep log of BP. Pt V/u. [address] [facility] to see if they received Reclast form. They stated that nothing was ever faxed over on 10/4. He can not find any Reclast form or therapy plan. He stated best number to fax it to is [phone]    MA's: are you aware if RECLAST form was faxed over to [facility] on 10/4?? I could not find any documentation on this. Please advise.

## 2022-10-20 NOTE — TELEPHONE ENCOUNTER
Re: HTN. Please ask her to increase her lisinopril to 20 mg every evening prior to bedtime and communicate pressures to me in 2 weeks. Re: Osteoporosis. Yes, we should have faxed over a reclast form on 10/4 when I saw her. Please follow-up with [facility] and if they have not received anything, please re-send. Please ask colleague to sign as I will not be back in office until 10/24. Re: sob and headaches. It could be from her uncontrolled HTN or stress.  We will monitor and please ask her to f/u with me in 4 weeks

## 2022-10-21 LAB — POTASSIUM: 5.3 MMOL/L (ref 3.5–5.3)

## 2022-10-24 RX ORDER — LISINOPRIL 20 MG/1
TABLET ORAL
Qty: 90 TABLET | Refills: 0 | OUTPATIENT
Start: 2022-10-24

## 2022-10-31 ENCOUNTER — OFFICE VISIT (OUTPATIENT)
Facility: LOCATION | Age: 67
End: 2022-10-31
Payer: MEDICARE

## 2022-10-31 VITALS — HEART RATE: 78 BPM | TEMPERATURE: 99 F

## 2022-10-31 DIAGNOSIS — Z80.0 FAMILY HISTORY OF COLON CANCER: Primary | ICD-10-CM

## 2022-10-31 PROCEDURE — 99202 OFFICE O/P NEW SF 15 MIN: CPT | Performed by: STUDENT IN AN ORGANIZED HEALTH CARE EDUCATION/TRAINING PROGRAM

## 2022-10-31 RX ORDER — POLYETHYLENE GLYCOL 3350, SODIUM CHLORIDE, SODIUM BICARBONATE, POTASSIUM CHLORIDE 420; 11.2; 5.72; 1.48 G/4L; G/4L; G/4L; G/4L
POWDER, FOR SOLUTION ORAL
Qty: 1 EACH | Refills: 0 | Status: SHIPPED | OUTPATIENT
Start: 2022-10-31

## 2022-11-01 PROBLEM — M81.0 SENILE OSTEOPOROSIS: Status: ACTIVE | Noted: 2022-11-01

## 2022-11-08 ENCOUNTER — OFFICE VISIT (OUTPATIENT)
Dept: HEMATOLOGY/ONCOLOGY | Age: 67
End: 2022-11-08
Attending: PHYSICIAN ASSISTANT
Payer: MEDICARE

## 2022-11-08 VITALS
OXYGEN SATURATION: 96 % | RESPIRATION RATE: 18 BRPM | TEMPERATURE: 99 F | DIASTOLIC BLOOD PRESSURE: 90 MMHG | SYSTOLIC BLOOD PRESSURE: 143 MMHG | HEART RATE: 87 BPM

## 2022-11-08 DIAGNOSIS — M81.0 SENILE OSTEOPOROSIS: Primary | ICD-10-CM

## 2022-11-08 PROCEDURE — 96365 THER/PROPH/DIAG IV INF INIT: CPT

## 2022-11-08 RX ORDER — MEPERIDINE HYDROCHLORIDE 25 MG/ML
25 INJECTION INTRAMUSCULAR; INTRAVENOUS; SUBCUTANEOUS ONCE
OUTPATIENT
Start: 2022-11-08

## 2022-11-08 RX ORDER — ZOLEDRONIC ACID 5 MG/100ML
5 INJECTION, SOLUTION INTRAVENOUS ONCE
Status: CANCELLED | OUTPATIENT
Start: 2022-11-08

## 2022-11-08 RX ORDER — FAMOTIDINE 10 MG/ML
20 INJECTION, SOLUTION INTRAVENOUS ONCE
OUTPATIENT
Start: 2022-11-08

## 2022-11-08 RX ORDER — ZOLEDRONIC ACID 5 MG/100ML
5 INJECTION, SOLUTION INTRAVENOUS ONCE
Status: COMPLETED | OUTPATIENT
Start: 2022-11-08 | End: 2022-11-08

## 2022-11-08 RX ORDER — METHYLPREDNISOLONE SODIUM SUCCINATE 125 MG/2ML
125 INJECTION, POWDER, LYOPHILIZED, FOR SOLUTION INTRAMUSCULAR; INTRAVENOUS ONCE
OUTPATIENT
Start: 2022-11-08

## 2022-11-08 RX ORDER — DIPHENHYDRAMINE HYDROCHLORIDE 50 MG/ML
25 INJECTION INTRAMUSCULAR; INTRAVENOUS ONCE
OUTPATIENT
Start: 2022-11-08

## 2022-11-08 RX ORDER — METHYLPREDNISOLONE SODIUM SUCCINATE 40 MG/ML
40 INJECTION, POWDER, LYOPHILIZED, FOR SOLUTION INTRAMUSCULAR; INTRAVENOUS ONCE
OUTPATIENT
Start: 2022-11-08

## 2022-11-08 RX ADMIN — ZOLEDRONIC ACID 5 MG: 5 INJECTION, SOLUTION INTRAVENOUS at 13:29:00

## 2022-11-08 NOTE — PROGRESS NOTES
.Education Record    Learner:  Patient    Disease / Diagnosis: here for reclast    Barriers / Limitations:  None    Method:  Brief focused, printed material and  reinforcement    General Topics:  Plan of care reviewed    Outcome: patient ambulatory. No complaints. Tolerated infusion. Discharged in stable condition.  Shows understanding

## 2022-11-15 RX ORDER — LISINOPRIL 20 MG/1
TABLET ORAL
Qty: 90 TABLET | Refills: 1 | Status: SHIPPED | OUTPATIENT
Start: 2022-11-15 | End: 2022-11-16

## 2022-11-15 NOTE — TELEPHONE ENCOUNTER
LOV: 10/4/2022 with Dr. Veda Sheffield  RTC: 6 weeks  Last Relevant Labs: October 2022  Filled: 10/21/2022    #90 with 0 refills    Future Appointments   Date Time Provider Abiel Acuña   11/16/2022 11:30 AM Everton Isaac MD [facility] [place]

## 2022-11-16 ENCOUNTER — OFFICE VISIT (OUTPATIENT)
Dept: INTERNAL MEDICINE CLINIC | Facility: CLINIC | Age: 67
End: 2022-11-16
Payer: MEDICARE

## 2022-11-16 VITALS
BODY MASS INDEX: 37.51 KG/M2 | RESPIRATION RATE: 16 BRPM | SYSTOLIC BLOOD PRESSURE: 132 MMHG | DIASTOLIC BLOOD PRESSURE: 80 MMHG | TEMPERATURE: 98 F | WEIGHT: 203.81 LBS | HEIGHT: 62 IN | OXYGEN SATURATION: 96 % | HEART RATE: 80 BPM

## 2022-11-16 DIAGNOSIS — Z80.0 FAMILY HISTORY OF COLON CANCER: ICD-10-CM

## 2022-11-16 DIAGNOSIS — I10 ESSENTIAL HYPERTENSION: Primary | ICD-10-CM

## 2022-11-16 DIAGNOSIS — M85.852 OSTEOPENIA OF NECK OF LEFT FEMUR: ICD-10-CM

## 2022-11-16 DIAGNOSIS — Z12.83 SKIN EXAM, SCREENING FOR CANCER: ICD-10-CM

## 2022-11-16 DIAGNOSIS — Z99.89 OSA ON CPAP: ICD-10-CM

## 2022-11-16 DIAGNOSIS — E66.01 CLASS 2 SEVERE OBESITY DUE TO EXCESS CALORIES WITH SERIOUS COMORBIDITY AND BODY MASS INDEX (BMI) OF 37.0 TO 37.9 IN ADULT (HCC): ICD-10-CM

## 2022-11-16 DIAGNOSIS — M81.0 SENILE OSTEOPOROSIS: ICD-10-CM

## 2022-11-16 DIAGNOSIS — R61 SWEATING INCREASE: ICD-10-CM

## 2022-11-16 DIAGNOSIS — G47.33 OSA ON CPAP: ICD-10-CM

## 2022-11-16 DIAGNOSIS — Z12.11 SCREEN FOR COLON CANCER: ICD-10-CM

## 2022-11-16 PROBLEM — J34.2 DEVIATED SEPTUM: Status: RESOLVED | Noted: 2019-02-04 | Resolved: 2022-11-16

## 2022-11-16 PROCEDURE — 3079F DIAST BP 80-89 MM HG: CPT | Performed by: INTERNAL MEDICINE

## 2022-11-16 PROCEDURE — 3075F SYST BP GE 130 - 139MM HG: CPT | Performed by: INTERNAL MEDICINE

## 2022-11-16 PROCEDURE — 3008F BODY MASS INDEX DOCD: CPT | Performed by: INTERNAL MEDICINE

## 2022-11-16 PROCEDURE — 99214 OFFICE O/P EST MOD 30 MIN: CPT | Performed by: INTERNAL MEDICINE

## 2022-11-16 RX ORDER — LISINOPRIL 40 MG/1
40 TABLET ORAL EVERY EVENING
Qty: 90 TABLET | Refills: 1 | Status: SHIPPED | OUTPATIENT
Start: 2022-11-16

## 2022-11-16 RX ORDER — TOPIRAMATE 25 MG/1
25 TABLET ORAL SEE ADMIN INSTRUCTIONS
Qty: 7 TABLET | Refills: 0 | Status: SHIPPED | OUTPATIENT
Start: 2022-11-16

## 2022-11-16 RX ORDER — TOPIRAMATE 50 MG/1
50 TABLET, FILM COATED ORAL DAILY
Qty: 30 TABLET | Refills: 1 | Status: SHIPPED | OUTPATIENT
Start: 2022-11-16

## 2022-11-16 NOTE — PATIENT INSTRUCTIONS
For your high blood pressure,please increase lisinopril to 40 mg every evening and send me your blood pressure values in 2 weeks. Please repeat your labs 2 weeks after increasing the lisinopril to 40 mg. You can take over the counter famotidine (pepcid) 30 minutes prior to meals you know will irritate your heartburn.

## 2022-12-12 ENCOUNTER — LAB ENCOUNTER (OUTPATIENT)
Dept: LAB | Age: 67
End: 2022-12-12
Attending: INTERNAL MEDICINE
Payer: MEDICARE

## 2022-12-12 LAB
ANION GAP SERPL CALC-SCNC: 2 MMOL/L (ref 0–18)
BUN BLD-MCNC: 11 MG/DL (ref 7–18)
CALCIUM BLD-MCNC: 9.2 MG/DL (ref 8.5–10.1)
CHLORIDE SERPL-SCNC: 108 MMOL/L (ref 98–112)
CO2 SERPL-SCNC: 28 MMOL/L (ref 21–32)
CREAT BLD-MCNC: 0.74 MG/DL
FASTING STATUS PATIENT QL REPORTED: NO
GFR SERPLBLD BASED ON 1.73 SQ M-ARVRAT: 90 ML/MIN/1.73M2 (ref 60–?)
GLUCOSE BLD-MCNC: 99 MG/DL (ref 70–99)
OSMOLALITY SERPL CALC.SUM OF ELEC: 285 MOSM/KG (ref 275–295)
POTASSIUM SERPL-SCNC: 3.8 MMOL/L (ref 3.5–5.1)
SODIUM SERPL-SCNC: 138 MMOL/L (ref 136–145)
TSI SER-ACNC: 1.84 MIU/ML (ref 0.36–3.74)

## 2022-12-12 PROCEDURE — 80048 BASIC METABOLIC PNL TOTAL CA: CPT | Performed by: INTERNAL MEDICINE

## 2022-12-12 PROCEDURE — 36415 COLL VENOUS BLD VENIPUNCTURE: CPT | Performed by: INTERNAL MEDICINE

## 2022-12-12 PROCEDURE — 84443 ASSAY THYROID STIM HORMONE: CPT | Performed by: INTERNAL MEDICINE

## 2022-12-12 RX ORDER — LISINOPRIL 20 MG/1
TABLET ORAL
Qty: 90 TABLET | Refills: 1 | OUTPATIENT
Start: 2022-12-12

## 2022-12-15 ENCOUNTER — PATIENT MESSAGE (OUTPATIENT)
Dept: INTERNAL MEDICINE CLINIC | Facility: CLINIC | Age: 67
End: 2022-12-15

## 2022-12-15 RX ORDER — AMLODIPINE BESYLATE 10 MG/1
10 TABLET ORAL EVERY EVENING
Qty: 90 TABLET | Refills: 0 | Status: SHIPPED | OUTPATIENT
Start: 2022-12-15

## 2022-12-16 NOTE — TELEPHONE ENCOUNTER
From: Bhavana Pass  Sent: 12/16/2022 1:52 PM CST  To: [facility] Clinical Staff  Subject: lisinopril    Dear Dr Keyes Part,   Thank you! I picked up the medicine but I was given AMLODIPINE Besylate 10mg tablets   Is this the medicine you want to take?   Thank you

## 2022-12-26 ENCOUNTER — HOSPITAL ENCOUNTER (OUTPATIENT)
Age: 67
Discharge: HOME | End: 2022-12-26
Attending: EMERGENCY MEDICINE
Payer: MEDICARE

## 2022-12-26 VITALS
WEIGHT: 200 LBS | DIASTOLIC BLOOD PRESSURE: 71 MMHG | TEMPERATURE: 99 F | HEIGHT: 63 IN | OXYGEN SATURATION: 97 % | SYSTOLIC BLOOD PRESSURE: 164 MMHG | HEART RATE: 106 BPM | RESPIRATION RATE: 24 BRPM | BODY MASS INDEX: 35.44 KG/M2

## 2022-12-26 DIAGNOSIS — J01.90 ACUTE NON-RECURRENT SINUSITIS, UNSPECIFIED LOCATION: Primary | ICD-10-CM

## 2022-12-26 LAB
POCT INFLUENZA A: NEGATIVE
POCT INFLUENZA B: NEGATIVE
SARS-COV-2 RNA RESP QL NAA+PROBE: NOT DETECTED

## 2022-12-26 PROCEDURE — 99214 OFFICE O/P EST MOD 30 MIN: CPT

## 2022-12-26 PROCEDURE — 87502 INFLUENZA DNA AMP PROBE: CPT | Performed by: EMERGENCY MEDICINE

## 2022-12-26 PROCEDURE — 99213 OFFICE O/P EST LOW 20 MIN: CPT

## 2022-12-26 RX ORDER — AMOXICILLIN AND CLAVULANATE POTASSIUM 875; 125 MG/1; MG/1
1 TABLET, FILM COATED ORAL 2 TIMES DAILY
Qty: 28 TABLET | Refills: 0 | Status: SHIPPED | OUTPATIENT
Start: 2022-12-26 | End: 2023-01-09

## 2022-12-26 NOTE — ED INITIAL ASSESSMENT (HPI)
Patient presents to C with c/o sinus pressure and congestion x 2 weeks. Occasional cough. No fever. Home covid negative.

## 2022-12-27 ENCOUNTER — TELEPHONE (OUTPATIENT)
Dept: INTERNAL MEDICINE CLINIC | Facility: CLINIC | Age: 67
End: 2022-12-27

## 2022-12-27 NOTE — TELEPHONE ENCOUNTER
Consulted with SV to make sure this issue is ok to be routed to [address] who will review her messages later today. Routed pt's message to KS.

## 2022-12-27 NOTE — TELEPHONE ENCOUNTER
Pt called stating  changed her BP medication from Lisinopril to Amlodipine 10mg. She states she was advised to cb if she notices any swelling in the ankles. Pt states there is slight swelling. Pt also states her bp has been in the 130's, her reading today was 136/86 and 132/84.

## 2023-01-04 ENCOUNTER — OFFICE VISIT (OUTPATIENT)
Dept: INTERNAL MEDICINE CLINIC | Facility: CLINIC | Age: 68
End: 2023-01-04
Payer: MEDICARE

## 2023-01-04 VITALS
TEMPERATURE: 99 F | WEIGHT: 211.19 LBS | HEIGHT: 63 IN | RESPIRATION RATE: 16 BRPM | SYSTOLIC BLOOD PRESSURE: 130 MMHG | HEART RATE: 83 BPM | BODY MASS INDEX: 37.42 KG/M2 | OXYGEN SATURATION: 98 % | DIASTOLIC BLOOD PRESSURE: 70 MMHG

## 2023-01-04 DIAGNOSIS — R05.1 ACUTE COUGH: Primary | ICD-10-CM

## 2023-01-04 DIAGNOSIS — R07.89 CHEST HEAVINESS: ICD-10-CM

## 2023-01-04 DIAGNOSIS — E66.01 CLASS 2 SEVERE OBESITY DUE TO EXCESS CALORIES WITH SERIOUS COMORBIDITY AND BODY MASS INDEX (BMI) OF 37.0 TO 37.9 IN ADULT (HCC): ICD-10-CM

## 2023-01-04 DIAGNOSIS — I10 ESSENTIAL HYPERTENSION: ICD-10-CM

## 2023-01-04 PROCEDURE — 99214 OFFICE O/P EST MOD 30 MIN: CPT | Performed by: INTERNAL MEDICINE

## 2023-01-04 RX ORDER — DEXTROMETHORPHAN HYDROBROMIDE AND PROMETHAZINE HYDROCHLORIDE 15; 6.25 MG/5ML; MG/5ML
5 SOLUTION ORAL NIGHTLY PRN
Qty: 118 ML | Refills: 0 | Status: SHIPPED | OUTPATIENT
Start: 2023-01-04 | End: 2023-01-18

## 2023-01-04 RX ORDER — AZELASTINE 1 MG/ML
1 SPRAY, METERED NASAL 2 TIMES DAILY
Qty: 30 ML | Refills: 0 | Status: SHIPPED | OUTPATIENT
Start: 2023-01-04

## 2023-01-04 RX ORDER — FLUTICASONE PROPIONATE 50 MCG
2 SPRAY, SUSPENSION (ML) NASAL DAILY
Qty: 16 G | Refills: 0 | Status: SHIPPED | OUTPATIENT
Start: 2023-01-04 | End: 2023-12-30

## 2023-01-04 RX ORDER — ALBUTEROL SULFATE 90 UG/1
2 AEROSOL, METERED RESPIRATORY (INHALATION) EVERY 4 HOURS PRN
Qty: 18 G | Refills: 0 | Status: SHIPPED | OUTPATIENT
Start: 2023-01-04

## 2023-01-04 NOTE — PATIENT INSTRUCTIONS
In addition to the prescriptions I sent, please resume your zyrtec daily while you are sick. Please see me 6 weeks AFTER starting topamax.

## 2023-01-09 ENCOUNTER — TELEPHONE (OUTPATIENT)
Dept: INTERNAL MEDICINE CLINIC | Facility: CLINIC | Age: 68
End: 2023-01-09

## 2023-01-09 NOTE — TELEPHONE ENCOUNTER
Patient calling because she is taking amLODIPine 10 MG Oral Tab. She is experiencing swelling from her ankles to her knees.       Call back: [phone] [place]

## 2023-01-10 NOTE — TELEPHONE ENCOUNTER
The swelling is most likely due to the amlodipine. She should discontinue amlodipine, elevate her legs, wear compression stockings. If her shortness of breath progresses then recommend presenting to emergency room. For her high blood pressure, I recommend lisinopril (this is the original medication I recommended for her but she read side effects and was anxious to start it).

## 2023-01-10 NOTE — TELEPHONE ENCOUNTER
Called patient again and spoke with her she is still having swelling BLE, no change since yesterday. Redness up to her ankles bilaterally, denies any other discoloration. Denies pain but it is uncomfortable and she is unable to put on shoes. Legs are hot to touch, some SOB since swelling has occurred. Denies Chest pain. Bilateral tingling, denies numbness. ROM bilaterally is present. Please advise. Thank you!

## 2023-01-10 NOTE — TELEPHONE ENCOUNTER
KS - please advise, ty    [phone] spoke to pt. Gave recommendations from Dr. Jessica Tse. Pt is currently in [place], will not return until 1/20/23. Pt says she developed a cough from the lisinopril, but she still has the medication at home in [place]. If KS wants pt to re-start lisinopril, pt will - but please send enough through 1/20/23 to the following pharmacy:    [address] [facility], [address]    Phone [phone]  Fax [phone]    Please call pt back to inform her if a prescription has been sent into [place].     Added [address] [facility] to frestyl.

## 2023-01-11 ENCOUNTER — TELEPHONE (OUTPATIENT)
Dept: INTERNAL MEDICINE CLINIC | Facility: CLINIC | Age: 68
End: 2023-01-11

## 2023-01-11 RX ORDER — LOSARTAN POTASSIUM 25 MG/1
25 TABLET ORAL EVERY EVENING
Qty: 30 TABLET | Refills: 0 | Status: SHIPPED | OUTPATIENT
Start: 2023-01-11

## 2023-01-11 NOTE — TELEPHONE ENCOUNTER
HPI: her swelling continued to worsen since seeing me on January 4th. It is in both legs and started prior to her travel thus less suspicion for acute DVT. Swelling has already started improving since halving her amlodipine dose. I have asked her to discontinue amlodipine completely and start losartan. Rx sent to pharmacy in [place].     She understands and agrees to plan

## 2023-01-11 NOTE — TELEPHONE ENCOUNTER
Patient calling back to follow up. Informed patient we are still waiting on 's response and we will give her a call back once we do.

## 2023-01-27 ENCOUNTER — LAB ENCOUNTER (OUTPATIENT)
Dept: LAB | Age: 68
End: 2023-01-27
Attending: INTERNAL MEDICINE
Payer: MEDICARE

## 2023-01-27 ENCOUNTER — OFFICE VISIT (OUTPATIENT)
Dept: INTERNAL MEDICINE CLINIC | Facility: CLINIC | Age: 68
End: 2023-01-27
Payer: MEDICARE

## 2023-01-27 VITALS
DIASTOLIC BLOOD PRESSURE: 80 MMHG | BODY MASS INDEX: 37 KG/M2 | OXYGEN SATURATION: 98 % | HEART RATE: 83 BPM | SYSTOLIC BLOOD PRESSURE: 140 MMHG | WEIGHT: 210 LBS | TEMPERATURE: 99 F | RESPIRATION RATE: 16 BRPM

## 2023-01-27 DIAGNOSIS — I10 ESSENTIAL HYPERTENSION: Primary | ICD-10-CM

## 2023-01-27 DIAGNOSIS — G47.33 OSA ON CPAP: ICD-10-CM

## 2023-01-27 DIAGNOSIS — Z80.0 FAMILY HISTORY OF COLON CANCER: ICD-10-CM

## 2023-01-27 DIAGNOSIS — R49.0 HOARSE VOICE QUALITY: ICD-10-CM

## 2023-01-27 DIAGNOSIS — Z99.89 OSA ON CPAP: ICD-10-CM

## 2023-01-27 DIAGNOSIS — E66.01 CLASS 2 SEVERE OBESITY DUE TO EXCESS CALORIES WITH SERIOUS COMORBIDITY AND BODY MASS INDEX (BMI) OF 37.0 TO 37.9 IN ADULT (HCC): ICD-10-CM

## 2023-01-27 LAB
ANION GAP SERPL CALC-SCNC: 6 MMOL/L (ref 0–18)
BUN BLD-MCNC: 12 MG/DL (ref 7–18)
CALCIUM BLD-MCNC: 9.2 MG/DL (ref 8.5–10.1)
CHLORIDE SERPL-SCNC: 107 MMOL/L (ref 98–112)
CO2 SERPL-SCNC: 27 MMOL/L (ref 21–32)
CREAT BLD-MCNC: 0.77 MG/DL
FASTING STATUS PATIENT QL REPORTED: NO
GFR SERPLBLD BASED ON 1.73 SQ M-ARVRAT: 86 ML/MIN/1.73M2 (ref 60–?)
GLUCOSE BLD-MCNC: 126 MG/DL (ref 70–99)
OSMOLALITY SERPL CALC.SUM OF ELEC: 291 MOSM/KG (ref 275–295)
POTASSIUM SERPL-SCNC: 5.1 MMOL/L (ref 3.5–5.1)
SODIUM SERPL-SCNC: 140 MMOL/L (ref 136–145)

## 2023-01-27 PROCEDURE — 99214 OFFICE O/P EST MOD 30 MIN: CPT | Performed by: INTERNAL MEDICINE

## 2023-01-27 PROCEDURE — 36415 COLL VENOUS BLD VENIPUNCTURE: CPT | Performed by: INTERNAL MEDICINE

## 2023-01-27 PROCEDURE — 80048 BASIC METABOLIC PNL TOTAL CA: CPT | Performed by: INTERNAL MEDICINE

## 2023-01-27 RX ORDER — TOPIRAMATE 50 MG/1
50 TABLET, FILM COATED ORAL DAILY
Qty: 30 TABLET | Refills: 2 | Status: SHIPPED | OUTPATIENT
Start: 2023-01-27

## 2023-01-27 RX ORDER — LOSARTAN POTASSIUM 50 MG/1
50 TABLET ORAL EVERY EVENING
Qty: 90 TABLET | Refills: 1 | Status: SHIPPED | OUTPATIENT
Start: 2023-01-27

## 2023-01-27 NOTE — PATIENT INSTRUCTIONS
Please increase your losartan to 50 mg every evening. Please STOP the albuterol inhaler. Please continue azelastine and flonase nasal spary and resume zyrtec every evening. However, if your cough and hoarse voice are not improved in 4 weeks, please seen ENT. Please see me 6-8 weeks after starting topamax.

## 2023-03-24 ENCOUNTER — LAB ENCOUNTER (OUTPATIENT)
Dept: LAB | Age: 68
End: 2023-03-24
Attending: INTERNAL MEDICINE
Payer: MEDICARE

## 2023-03-24 ENCOUNTER — OFFICE VISIT (OUTPATIENT)
Dept: INTERNAL MEDICINE CLINIC | Facility: CLINIC | Age: 68
End: 2023-03-24
Payer: MEDICARE

## 2023-03-24 VITALS
TEMPERATURE: 98 F | HEART RATE: 79 BPM | SYSTOLIC BLOOD PRESSURE: 110 MMHG | HEIGHT: 62.75 IN | DIASTOLIC BLOOD PRESSURE: 80 MMHG | WEIGHT: 203 LBS | RESPIRATION RATE: 16 BRPM | BODY MASS INDEX: 36.42 KG/M2 | OXYGEN SATURATION: 98 %

## 2023-03-24 DIAGNOSIS — G47.33 OSA ON CPAP: ICD-10-CM

## 2023-03-24 DIAGNOSIS — Z12.11 SCREEN FOR COLON CANCER: ICD-10-CM

## 2023-03-24 DIAGNOSIS — Z99.89 OSA ON CPAP: ICD-10-CM

## 2023-03-24 DIAGNOSIS — E66.01 CLASS 2 SEVERE OBESITY DUE TO EXCESS CALORIES WITH SERIOUS COMORBIDITY AND BODY MASS INDEX (BMI) OF 36.0 TO 36.9 IN ADULT (HCC): ICD-10-CM

## 2023-03-24 DIAGNOSIS — R10.13 EPIGASTRIC PAIN: ICD-10-CM

## 2023-03-24 DIAGNOSIS — I10 ESSENTIAL HYPERTENSION: Primary | ICD-10-CM

## 2023-03-24 DIAGNOSIS — R73.01 IMPAIRED FASTING GLUCOSE: ICD-10-CM

## 2023-03-24 DIAGNOSIS — I10 ESSENTIAL HYPERTENSION: ICD-10-CM

## 2023-03-24 LAB
ALBUMIN SERPL-MCNC: 3.8 G/DL (ref 3.4–5)
ALP LIVER SERPL-CCNC: 79 U/L
ALT SERPL-CCNC: 37 U/L
ANION GAP SERPL CALC-SCNC: 8 MMOL/L (ref 0–18)
AST SERPL-CCNC: 19 U/L (ref 15–37)
BILIRUB DIRECT SERPL-MCNC: 0.1 MG/DL (ref 0–0.2)
BILIRUB SERPL-MCNC: 0.4 MG/DL (ref 0.1–2)
BUN BLD-MCNC: 10 MG/DL (ref 7–18)
CALCIUM BLD-MCNC: 9.6 MG/DL (ref 8.5–10.1)
CHLORIDE SERPL-SCNC: 105 MMOL/L (ref 98–112)
CHOLEST SERPL-MCNC: 200 MG/DL (ref ?–200)
CO2 SERPL-SCNC: 26 MMOL/L (ref 21–32)
CREAT BLD-MCNC: 0.87 MG/DL
EST. AVERAGE GLUCOSE BLD GHB EST-MCNC: 128 MG/DL (ref 68–126)
FASTING PATIENT LIPID ANSWER: YES
FASTING STATUS PATIENT QL REPORTED: YES
GFR SERPLBLD BASED ON 1.73 SQ M-ARVRAT: 74 ML/MIN/1.73M2 (ref 60–?)
GLUCOSE BLD-MCNC: 122 MG/DL (ref 70–99)
HBA1C MFR BLD: 6.1 % (ref ?–5.7)
HDLC SERPL-MCNC: 76 MG/DL (ref 40–59)
LDLC SERPL CALC-MCNC: 104 MG/DL (ref ?–100)
LIPASE SERPL-CCNC: 28 U/L (ref 13–75)
NONHDLC SERPL-MCNC: 124 MG/DL (ref ?–130)
OSMOLALITY SERPL CALC.SUM OF ELEC: 288 MOSM/KG (ref 275–295)
POTASSIUM SERPL-SCNC: 5 MMOL/L (ref 3.5–5.1)
PROT SERPL-MCNC: 8.1 G/DL (ref 6.4–8.2)
SODIUM SERPL-SCNC: 139 MMOL/L (ref 136–145)
TRIGL SERPL-MCNC: 114 MG/DL (ref 30–149)
VLDLC SERPL CALC-MCNC: 19 MG/DL (ref 0–30)

## 2023-03-24 PROCEDURE — 83036 HEMOGLOBIN GLYCOSYLATED A1C: CPT

## 2023-03-24 PROCEDURE — 80061 LIPID PANEL: CPT

## 2023-03-24 PROCEDURE — 99214 OFFICE O/P EST MOD 30 MIN: CPT | Performed by: INTERNAL MEDICINE

## 2023-03-24 PROCEDURE — 80076 HEPATIC FUNCTION PANEL: CPT

## 2023-03-24 PROCEDURE — 80048 BASIC METABOLIC PNL TOTAL CA: CPT

## 2023-03-24 PROCEDURE — 83690 ASSAY OF LIPASE: CPT

## 2023-03-24 PROCEDURE — 36415 COLL VENOUS BLD VENIPUNCTURE: CPT

## 2023-03-24 NOTE — PATIENT INSTRUCTIONS
Please start over the counter famotidine (pepcid) 20 mg thirty minutes prior to breakfast and dinner while we further investigate your stomach issues. If you are not able to complete your colonoscopy in the next 3 months, then we should at least do a stool test at home.

## 2023-03-28 ENCOUNTER — PATIENT MESSAGE (OUTPATIENT)
Dept: INTERNAL MEDICINE CLINIC | Facility: CLINIC | Age: 68
End: 2023-03-28

## 2023-03-28 NOTE — TELEPHONE ENCOUNTER
From: Jared Steel  To: Sarah Mendez MD  Sent: 3/28/2023 10:55 AM CDT  Subject: Test result     Dear Dr Lucy Blanc,  When you have a moment please look at my blood work test results done on 3/24  And please advise.   Sincerely,  Jared Steel

## 2023-04-06 ENCOUNTER — HOSPITAL ENCOUNTER (OUTPATIENT)
Dept: ULTRASOUND IMAGING | Age: 68
Discharge: HOME | End: 2023-04-06
Attending: INTERNAL MEDICINE
Payer: MEDICARE

## 2023-04-06 DIAGNOSIS — R10.13 EPIGASTRIC PAIN: ICD-10-CM

## 2023-04-06 DIAGNOSIS — R73.01 IMPAIRED FASTING GLUCOSE: ICD-10-CM

## 2023-04-06 PROCEDURE — 76700 US EXAM ABDOM COMPLETE: CPT | Performed by: INTERNAL MEDICINE

## 2023-07-26 DIAGNOSIS — E55.9 VITAMIN D DEFICIENCY: ICD-10-CM

## 2023-07-26 DIAGNOSIS — I10 ESSENTIAL HYPERTENSION: ICD-10-CM

## 2023-07-26 DIAGNOSIS — R73.01 IMPAIRED FASTING GLUCOSE: Primary | ICD-10-CM

## 2023-07-27 ENCOUNTER — TELEPHONE (OUTPATIENT)
Dept: INTERNAL MEDICINE CLINIC | Facility: CLINIC | Age: 68
End: 2023-07-27

## 2023-07-27 RX ORDER — LOSARTAN POTASSIUM 50 MG/1
50 TABLET ORAL EVERY EVENING
Qty: 90 TABLET | Refills: 0 | Status: SHIPPED | OUTPATIENT
Start: 2023-07-27

## 2023-07-27 NOTE — TELEPHONE ENCOUNTER
Protocol passed     Requesting: losartan 50mg     LOV: 3/24/23   RTC: 7 months   Filled: 1/27/23 # 90 1 refill   Recent Labs: 3/24/23     Upcoming OV: none scheduled

## 2023-07-27 NOTE — TELEPHONE ENCOUNTER
Pt called requesting a refill for losartan 50 MG Oral Tab     [id number] - [address] AT [facility] OF [address] [place] [address], [phone], [phone]

## 2023-07-27 NOTE — TELEPHONE ENCOUNTER
Called pt and informed pt that her prescription has been approved     Also informed pt she is due for labs on 9/23    Pt v/u

## 2023-08-10 ENCOUNTER — TELEPHONE (OUTPATIENT)
Dept: INTERNAL MEDICINE CLINIC | Facility: CLINIC | Age: 68
End: 2023-08-10

## 2023-08-10 DIAGNOSIS — G47.33 OBSTRUCTIVE SLEEP APNEA: Primary | ICD-10-CM

## 2023-08-21 ENCOUNTER — ORDER TRANSCRIPTION (OUTPATIENT)
Dept: ADMINISTRATIVE | Facility: HOSPITAL | Age: 68
End: 2023-08-21

## 2023-08-21 DIAGNOSIS — Z12.31 ENCOUNTER FOR SCREENING MAMMOGRAM FOR MALIGNANT NEOPLASM OF BREAST: Primary | ICD-10-CM

## 2023-09-07 ENCOUNTER — LAB ENCOUNTER (OUTPATIENT)
Dept: LAB | Age: 68
End: 2023-09-07
Attending: INTERNAL MEDICINE
Payer: MEDICARE

## 2023-09-07 DIAGNOSIS — E55.9 VITAMIN D DEFICIENCY: ICD-10-CM

## 2023-09-07 DIAGNOSIS — I10 ESSENTIAL HYPERTENSION: ICD-10-CM

## 2023-09-07 DIAGNOSIS — R73.01 IMPAIRED FASTING GLUCOSE: ICD-10-CM

## 2023-09-07 LAB
ALT SERPL-CCNC: 35 U/L
ANION GAP SERPL CALC-SCNC: 6 MMOL/L (ref 0–18)
AST SERPL-CCNC: 18 U/L (ref 15–37)
BUN BLD-MCNC: 9 MG/DL (ref 7–18)
CALCIUM BLD-MCNC: 9 MG/DL (ref 8.5–10.1)
CHLORIDE SERPL-SCNC: 108 MMOL/L (ref 98–112)
CO2 SERPL-SCNC: 25 MMOL/L (ref 21–32)
CREAT BLD-MCNC: 0.7 MG/DL
EGFRCR SERPLBLD CKD-EPI 2021: 95 ML/MIN/1.73M2 (ref 60–?)
EST. AVERAGE GLUCOSE BLD GHB EST-MCNC: 131 MG/DL (ref 68–126)
FASTING STATUS PATIENT QL REPORTED: NO
GLUCOSE BLD-MCNC: 133 MG/DL (ref 70–99)
HBA1C MFR BLD: 6.2 % (ref ?–5.7)
OSMOLALITY SERPL CALC.SUM OF ELEC: 289 MOSM/KG (ref 275–295)
POTASSIUM SERPL-SCNC: 4.6 MMOL/L (ref 3.5–5.1)
SODIUM SERPL-SCNC: 139 MMOL/L (ref 136–145)
VIT D+METAB SERPL-MCNC: 37.6 NG/ML (ref 30–100)

## 2023-09-07 PROCEDURE — 36415 COLL VENOUS BLD VENIPUNCTURE: CPT

## 2023-09-07 PROCEDURE — 83036 HEMOGLOBIN GLYCOSYLATED A1C: CPT

## 2023-09-07 PROCEDURE — 84460 ALANINE AMINO (ALT) (SGPT): CPT

## 2023-09-07 PROCEDURE — 82306 VITAMIN D 25 HYDROXY: CPT

## 2023-09-07 PROCEDURE — 80048 BASIC METABOLIC PNL TOTAL CA: CPT

## 2023-09-07 PROCEDURE — 84450 TRANSFERASE (AST) (SGOT): CPT

## 2023-09-23 ENCOUNTER — HOSPITAL ENCOUNTER (OUTPATIENT)
Age: 68
Discharge: HOME | End: 2023-09-23
Attending: EMERGENCY MEDICINE
Payer: MEDICARE

## 2023-09-23 VITALS
WEIGHT: 214 LBS | DIASTOLIC BLOOD PRESSURE: 81 MMHG | HEART RATE: 85 BPM | TEMPERATURE: 98 F | HEIGHT: 63 IN | BODY MASS INDEX: 37.92 KG/M2 | SYSTOLIC BLOOD PRESSURE: 152 MMHG | RESPIRATION RATE: 20 BRPM | OXYGEN SATURATION: 98 %

## 2023-09-23 DIAGNOSIS — H00.015 HORDEOLUM EXTERNUM OF LEFT LOWER EYELID: Primary | ICD-10-CM

## 2023-09-23 PROCEDURE — 99214 OFFICE O/P EST MOD 30 MIN: CPT

## 2023-09-23 PROCEDURE — 99213 OFFICE O/P EST LOW 20 MIN: CPT

## 2023-09-23 RX ORDER — TOBRAMYCIN 3 MG/ML
2 SOLUTION/ DROPS OPHTHALMIC
Qty: 1 EACH | Refills: 0 | Status: SHIPPED | OUTPATIENT
Start: 2023-09-23 | End: 2023-10-03

## 2023-09-23 NOTE — ED INITIAL ASSESSMENT (HPI)
Patient reports she has been having eye problems for a couple of days. Patient reports lower lid swelling and redness and pain.

## 2023-09-27 ENCOUNTER — OFFICE VISIT (OUTPATIENT)
Dept: INTERNAL MEDICINE CLINIC | Facility: CLINIC | Age: 68
End: 2023-09-27
Payer: MEDICARE

## 2023-09-27 VITALS
RESPIRATION RATE: 16 BRPM | WEIGHT: 216.38 LBS | HEART RATE: 84 BPM | OXYGEN SATURATION: 99 % | TEMPERATURE: 98 F | SYSTOLIC BLOOD PRESSURE: 130 MMHG | HEIGHT: 63 IN | BODY MASS INDEX: 38.34 KG/M2 | DIASTOLIC BLOOD PRESSURE: 80 MMHG

## 2023-09-27 DIAGNOSIS — E55.9 VITAMIN D DEFICIENCY: ICD-10-CM

## 2023-09-27 DIAGNOSIS — E66.01 CLASS 2 SEVERE OBESITY DUE TO EXCESS CALORIES WITH SERIOUS COMORBIDITY AND BODY MASS INDEX (BMI) OF 38.0 TO 38.9 IN ADULT: Primary | ICD-10-CM

## 2023-09-27 DIAGNOSIS — I10 ESSENTIAL HYPERTENSION: ICD-10-CM

## 2023-09-27 DIAGNOSIS — H00.015 HORDEOLUM EXTERNUM LEFT LOWER EYELID: ICD-10-CM

## 2023-09-27 DIAGNOSIS — R73.01 IMPAIRED FASTING GLUCOSE: ICD-10-CM

## 2023-09-27 PROCEDURE — 99214 OFFICE O/P EST MOD 30 MIN: CPT | Performed by: INTERNAL MEDICINE

## 2023-09-27 RX ORDER — LOSARTAN POTASSIUM 100 MG/1
100 TABLET ORAL EVERY EVENING
Qty: 90 TABLET | Refills: 1 | Status: SHIPPED | OUTPATIENT
Start: 2023-09-27

## 2023-09-27 NOTE — PATIENT INSTRUCTIONS
I recommend the following vaccines -  RSV vaccine as soon as possible. Annual flu shot every September, October. Stay up to date with COVID vaccines.

## 2023-10-14 ENCOUNTER — HOSPITAL ENCOUNTER (OUTPATIENT)
Dept: MAMMOGRAPHY | Age: 68
Discharge: HOME | End: 2023-10-14
Attending: INTERNAL MEDICINE
Payer: MEDICARE

## 2023-10-14 DIAGNOSIS — Z12.31 ENCOUNTER FOR SCREENING MAMMOGRAM FOR MALIGNANT NEOPLASM OF BREAST: ICD-10-CM

## 2023-10-14 PROCEDURE — 77067 SCR MAMMO BI INCL CAD: CPT | Performed by: INTERNAL MEDICINE

## 2023-10-14 PROCEDURE — 77063 BREAST TOMOSYNTHESIS BI: CPT | Performed by: INTERNAL MEDICINE

## 2023-10-16 ENCOUNTER — OFFICE VISIT (OUTPATIENT)
Dept: INTERNAL MEDICINE CLINIC | Facility: CLINIC | Age: 68
End: 2023-10-16
Payer: MEDICARE

## 2023-10-16 VITALS
TEMPERATURE: 98 F | HEART RATE: 76 BPM | SYSTOLIC BLOOD PRESSURE: 124 MMHG | HEIGHT: 63 IN | BODY MASS INDEX: 38.27 KG/M2 | RESPIRATION RATE: 16 BRPM | OXYGEN SATURATION: 96 % | WEIGHT: 216 LBS | DIASTOLIC BLOOD PRESSURE: 80 MMHG

## 2023-10-16 DIAGNOSIS — I10 ESSENTIAL HYPERTENSION: ICD-10-CM

## 2023-10-16 DIAGNOSIS — R73.01 IMPAIRED FASTING GLUCOSE: ICD-10-CM

## 2023-10-16 DIAGNOSIS — E55.9 VITAMIN D DEFICIENCY: ICD-10-CM

## 2023-10-16 DIAGNOSIS — Z00.00 ROUTINE GENERAL MEDICAL EXAMINATION AT A HEALTH CARE FACILITY: Primary | ICD-10-CM

## 2023-10-16 DIAGNOSIS — Z80.0 FAMILY HISTORY OF COLON CANCER: ICD-10-CM

## 2023-10-16 DIAGNOSIS — E66.01 CLASS 2 SEVERE OBESITY DUE TO EXCESS CALORIES WITH SERIOUS COMORBIDITY AND BODY MASS INDEX (BMI) OF 38.0 TO 38.9 IN ADULT: ICD-10-CM

## 2023-10-16 DIAGNOSIS — G47.33 OSA ON CPAP: ICD-10-CM

## 2023-10-16 DIAGNOSIS — M85.852 OSTEOPENIA OF NECK OF LEFT FEMUR: ICD-10-CM

## 2023-10-16 DIAGNOSIS — Z12.11 SCREEN FOR COLON CANCER: ICD-10-CM

## 2023-10-16 DIAGNOSIS — Z01.00 EYE EXAM, ROUTINE: ICD-10-CM

## 2023-10-16 DIAGNOSIS — F33.1 MODERATE EPISODE OF RECURRENT MAJOR DEPRESSIVE DISORDER (HCC): ICD-10-CM

## 2023-10-16 DIAGNOSIS — Z12.83 SCREENING EXAM FOR SKIN CANCER: ICD-10-CM

## 2023-10-16 PROBLEM — R10.13 EPIGASTRIC PAIN: Status: RESOLVED | Noted: 2023-03-24 | Resolved: 2023-10-16

## 2023-10-16 NOTE — PATIENT INSTRUCTIONS
Please complete your fasting labs in March, 2024. I recommend the following vaccines -  RSV vaccine     Annual flu shot every September, October. Stay up to date with COVID vaccines.

## 2023-10-17 ENCOUNTER — TELEPHONE (OUTPATIENT)
Dept: INTERNAL MEDICINE CLINIC | Facility: CLINIC | Age: 68
End: 2023-10-17

## 2023-10-17 DIAGNOSIS — M81.0 AGE-RELATED OSTEOPOROSIS WITHOUT CURRENT PATHOLOGICAL FRACTURE: Primary | ICD-10-CM

## 2023-10-17 NOTE — TELEPHONE ENCOUNTER
Reclast referral placed. Pending authorization. Sent in-basket message to [facility] alerting of new referral placement. Forms placed in \"pending basket\" above Camilla's desk in cabinet in triage.

## 2023-11-03 ENCOUNTER — OFFICE VISIT (OUTPATIENT)
Dept: HEMATOLOGY/ONCOLOGY | Age: 68
End: 2023-11-03
Attending: INTERNAL MEDICINE
Payer: MEDICARE

## 2023-11-03 VITALS
SYSTOLIC BLOOD PRESSURE: 121 MMHG | RESPIRATION RATE: 18 BRPM | HEIGHT: 62.99 IN | DIASTOLIC BLOOD PRESSURE: 75 MMHG | HEART RATE: 96 BPM | OXYGEN SATURATION: 98 % | WEIGHT: 216.5 LBS | TEMPERATURE: 98 F | BODY MASS INDEX: 38.36 KG/M2

## 2023-11-03 DIAGNOSIS — M81.0 SENILE OSTEOPOROSIS: Primary | ICD-10-CM

## 2023-11-03 LAB
ALBUMIN SERPL-MCNC: 3.9 G/DL (ref 3.4–5)
CALCIUM BLD-MCNC: 8.9 MG/DL (ref 8.5–10.1)
CREAT BLD-MCNC: 0.9 MG/DL
EGFRCR SERPLBLD CKD-EPI 2021: 71 ML/MIN/1.73M2 (ref 60–?)

## 2023-11-03 PROCEDURE — 82310 ASSAY OF CALCIUM: CPT

## 2023-11-03 PROCEDURE — 96365 THER/PROPH/DIAG IV INF INIT: CPT

## 2023-11-03 PROCEDURE — 82565 ASSAY OF CREATININE: CPT

## 2023-11-03 PROCEDURE — 36415 COLL VENOUS BLD VENIPUNCTURE: CPT

## 2023-11-03 PROCEDURE — 82040 ASSAY OF SERUM ALBUMIN: CPT

## 2023-11-03 RX ORDER — ZOLEDRONIC ACID 5 MG/100ML
5 INJECTION, SOLUTION INTRAVENOUS ONCE
Status: CANCELLED | OUTPATIENT
Start: 2023-11-03

## 2023-11-03 RX ORDER — METHYLPREDNISOLONE SODIUM SUCCINATE 40 MG/ML
40 INJECTION, POWDER, LYOPHILIZED, FOR SOLUTION INTRAMUSCULAR; INTRAVENOUS ONCE
OUTPATIENT
Start: 2023-11-03

## 2023-11-03 RX ORDER — FAMOTIDINE 10 MG/ML
20 INJECTION, SOLUTION INTRAVENOUS ONCE
OUTPATIENT
Start: 2023-11-03

## 2023-11-03 RX ORDER — ZOLEDRONIC ACID 5 MG/100ML
5 INJECTION, SOLUTION INTRAVENOUS ONCE
Status: COMPLETED | OUTPATIENT
Start: 2023-11-03 | End: 2023-11-03

## 2023-11-03 RX ORDER — MEPERIDINE HYDROCHLORIDE 25 MG/ML
25 INJECTION INTRAMUSCULAR; INTRAVENOUS; SUBCUTANEOUS ONCE
OUTPATIENT
Start: 2023-11-03

## 2023-11-03 RX ORDER — DIPHENHYDRAMINE HYDROCHLORIDE 50 MG/ML
25 INJECTION INTRAMUSCULAR; INTRAVENOUS ONCE
OUTPATIENT
Start: 2023-11-03

## 2023-11-03 RX ORDER — METHYLPREDNISOLONE SODIUM SUCCINATE 125 MG/2ML
125 INJECTION, POWDER, LYOPHILIZED, FOR SOLUTION INTRAMUSCULAR; INTRAVENOUS ONCE
OUTPATIENT
Start: 2023-11-03

## 2023-11-03 RX ADMIN — ZOLEDRONIC ACID 5 MG: 5 INJECTION, SOLUTION INTRAVENOUS at 14:35:00

## 2023-11-03 NOTE — PROGRESS NOTES
Education Record    Learner:  Patient    Disease / Diagnosis: patient here for reclast infusion    Barriers / Limitations:  None    Method:  Brief focused, printed material and  reinforcement    General Topics:  Plan of care reviewed    Outcome:  Shows understanding   Patient tolerated infusion, no c/o. Discharged home in stable condition.

## 2024-03-18 DIAGNOSIS — E55.9 VITAMIN D DEFICIENCY: ICD-10-CM

## 2024-03-18 DIAGNOSIS — R73.01 IMPAIRED FASTING GLUCOSE: ICD-10-CM

## 2024-03-18 DIAGNOSIS — I10 ESSENTIAL HYPERTENSION: ICD-10-CM

## 2024-03-18 NOTE — TELEPHONE ENCOUNTER
Requesting    losartan 100 MG Oral Tab         Sig: Take 1 tablet (100 mg total) by mouth every evening. Replaces 50 mg    Disp: 90 tablet    Refills: 1    Start: 3/18/2024    Class: Normal    Non-formulary For: Essential hypertension, Impaired fasting glucose, Vitamin D deficiency    Last ordered: 5 months ago (9/27/2023) by Juli Herrera MD    Hypertension Medications Protocol [id number]/18/2024 10:42 AM   Protocol Details CMP or BMP in past 12 months    Last BP reading less than 140/90    In person appointment or virtual visit in the past 12 mos or appointment in next 3 mos    EGFRCR or GFRNAA > 50      To be filled at: None [Patient requested: [facility] in [facility]]     LOV: 10/16/2023  RTC: 6 months   Last Relevant Labs: 9/7/2023  Filled: 9/27/2023 #90 with 1 refills    No future appointments.

## 2024-03-19 RX ORDER — LOSARTAN POTASSIUM 100 MG/1
100 TABLET ORAL EVERY EVENING
Qty: 90 TABLET | Refills: 1 | Status: SHIPPED | OUTPATIENT
Start: 2024-03-19

## 2024-03-19 NOTE — TELEPHONE ENCOUNTER
Called patient to inform labs have been placed and to schedule AWV. Patient stated she is currently in [place] but as soon as she gets back home she will complete labs and schedule appointment.

## 2024-05-29 ENCOUNTER — TELEPHONE (OUTPATIENT)
Dept: INTERNAL MEDICINE CLINIC | Facility: CLINIC | Age: 69
End: 2024-05-29

## 2024-05-29 NOTE — TELEPHONE ENCOUNTER
Incoming fax from [facility] [place]    Requesting patients medical records to be sent to [phone]    Placed up in phone room in Medical records bin to be sent to Medical Records Department

## (undated) DIAGNOSIS — E55.9 VITAMIN D DEFICIENCY: Primary | ICD-10-CM

## (undated) DIAGNOSIS — R73.01 IMPAIRED FASTING GLUCOSE: ICD-10-CM

## (undated) DIAGNOSIS — Z99.89 OSA ON CPAP: ICD-10-CM

## (undated) DIAGNOSIS — I10 ESSENTIAL HYPERTENSION: Primary | ICD-10-CM

## (undated) DIAGNOSIS — G47.33 OSA ON CPAP: ICD-10-CM

## (undated) NOTE — LETTER
10/31/22    Patient: Hollie Shabazz  :  Visit date: 10/31/2022    Dear  Lázaro Rodriguez MD    Thank you for referring Hollie Shabazz to my practice. Please find my assessment and plan below. Assessment   Family history of colon cancer  (primary encounter diagnosis)    Hollie Shabazz is a very nice 74year old female who was referred to me for screening colonoscopy. She has had 2 previous lifetime colonoscopies. She believes her last colonoscopy was approximately 5 years ago at [facility]. She does not recall them finding any polyps. Her brother passed away from colon cancer though was also on immunosuppression for heart transplant. She denies any rectal bleeding, changes in bowel habits, anemia or unintentional weight loss     Plan  I do recommend scheduling a colonoscopy. The procedure was discussed in detail including the prep instructions, risks, benefits and alternatives. Patient expressed understanding and wished to schedule a colonoscopy using GoLytely prep in 2023. I advised her to call my schedulers and early December of this year to confirm a procedure date. Patient was agreeable to plan.       Sincerely,       Arnie Solorio MD   CC: No Recipients